# Patient Record
Sex: FEMALE | Race: WHITE | Employment: PART TIME | URBAN - NONMETROPOLITAN AREA
[De-identification: names, ages, dates, MRNs, and addresses within clinical notes are randomized per-mention and may not be internally consistent; named-entity substitution may affect disease eponyms.]

---

## 2017-01-23 ENCOUNTER — OFFICE VISIT (OUTPATIENT)
Dept: NEUROSURGERY | Age: 52
End: 2017-01-23

## 2017-01-23 VITALS
BODY MASS INDEX: 26.09 KG/M2 | SYSTOLIC BLOOD PRESSURE: 142 MMHG | DIASTOLIC BLOOD PRESSURE: 87 MMHG | WEIGHT: 156.6 LBS | HEART RATE: 85 BPM | HEIGHT: 65 IN

## 2017-01-23 DIAGNOSIS — M54.2 NECK PAIN: Primary | ICD-10-CM

## 2017-01-23 PROCEDURE — 99202 OFFICE O/P NEW SF 15 MIN: CPT | Performed by: NEUROLOGICAL SURGERY

## 2017-01-23 ASSESSMENT — ENCOUNTER SYMPTOMS
VOICE CHANGE: 0
SHORTNESS OF BREATH: 0
SINUS PRESSURE: 0
STRIDOR: 0
TROUBLE SWALLOWING: 0
BACK PAIN: 0
COLOR CHANGE: 0
RHINORRHEA: 0
ABDOMINAL PAIN: 0
PHOTOPHOBIA: 0
NAUSEA: 0
COUGH: 0
VOMITING: 0
EYE PAIN: 0

## 2017-02-08 ENCOUNTER — OFFICE VISIT (OUTPATIENT)
Dept: PHYSICAL MEDICINE AND REHAB | Age: 52
End: 2017-02-08

## 2017-02-08 VITALS
HEIGHT: 65 IN | DIASTOLIC BLOOD PRESSURE: 72 MMHG | BODY MASS INDEX: 26.42 KG/M2 | SYSTOLIC BLOOD PRESSURE: 132 MMHG | WEIGHT: 158.6 LBS | HEART RATE: 103 BPM

## 2017-02-08 DIAGNOSIS — G89.4 CHRONIC PAIN SYNDROME: ICD-10-CM

## 2017-02-08 DIAGNOSIS — M47.812 SPONDYLOSIS OF CERVICAL REGION WITHOUT MYELOPATHY OR RADICULOPATHY: Primary | ICD-10-CM

## 2017-02-08 PROCEDURE — 99244 OFF/OP CNSLTJ NEW/EST MOD 40: CPT | Performed by: PAIN MEDICINE

## 2017-02-08 RX ORDER — NICOTINE 7 MG/24H
PATCH, EXTENDED RELEASE TRANSDERMAL EVERY 24 HOURS
COMMUNITY
Start: 2017-02-03 | End: 2017-07-25 | Stop reason: ALTCHOICE

## 2017-02-08 ASSESSMENT — ENCOUNTER SYMPTOMS
VOMITING: 0
DIARRHEA: 0
NAUSEA: 0
SINUS PRESSURE: 0
RHINORRHEA: 0
COUGH: 0
SORE THROAT: 0
CONSTIPATION: 0
SHORTNESS OF BREATH: 0
EYE PAIN: 0
WHEEZING: 0
COLOR CHANGE: 0
BACK PAIN: 0
CHEST TIGHTNESS: 0
ABDOMINAL PAIN: 0
PHOTOPHOBIA: 0

## 2017-03-30 ENCOUNTER — OFFICE VISIT (OUTPATIENT)
Dept: PHYSICAL MEDICINE AND REHAB | Age: 52
End: 2017-03-30

## 2017-03-30 VITALS
SYSTOLIC BLOOD PRESSURE: 124 MMHG | BODY MASS INDEX: 26.19 KG/M2 | HEIGHT: 65 IN | WEIGHT: 157.19 LBS | HEART RATE: 83 BPM | DIASTOLIC BLOOD PRESSURE: 70 MMHG

## 2017-03-30 DIAGNOSIS — G89.4 CHRONIC PAIN SYNDROME: ICD-10-CM

## 2017-03-30 DIAGNOSIS — M47.812 SPONDYLOSIS OF CERVICAL REGION WITHOUT MYELOPATHY OR RADICULOPATHY: Primary | ICD-10-CM

## 2017-03-30 PROCEDURE — 99213 OFFICE O/P EST LOW 20 MIN: CPT | Performed by: NURSE PRACTITIONER

## 2017-03-30 RX ORDER — GABAPENTIN 300 MG/1
300 CAPSULE ORAL NIGHTLY
Qty: 30 CAPSULE | Refills: 0 | Status: SHIPPED | OUTPATIENT
Start: 2017-03-30 | End: 2017-04-29 | Stop reason: SDUPTHER

## 2017-03-30 ASSESSMENT — ENCOUNTER SYMPTOMS
COUGH: 0
COLOR CHANGE: 0
ABDOMINAL PAIN: 0
WHEEZING: 0
DIARRHEA: 0
SORE THROAT: 0
NAUSEA: 0
CHEST TIGHTNESS: 0
SINUS PRESSURE: 0
BACK PAIN: 0
RHINORRHEA: 0
VOMITING: 0
EYE PAIN: 0
SHORTNESS OF BREATH: 0
PHOTOPHOBIA: 0
CONSTIPATION: 0

## 2017-05-01 RX ORDER — GABAPENTIN 300 MG/1
CAPSULE ORAL
Qty: 30 CAPSULE | Refills: 0 | Status: SHIPPED | OUTPATIENT
Start: 2017-05-01 | End: 2017-05-25 | Stop reason: SDUPTHER

## 2017-05-25 ENCOUNTER — OFFICE VISIT (OUTPATIENT)
Dept: PHYSICAL MEDICINE AND REHAB | Age: 52
End: 2017-05-25

## 2017-05-25 VITALS
WEIGHT: 159 LBS | HEART RATE: 87 BPM | SYSTOLIC BLOOD PRESSURE: 117 MMHG | BODY MASS INDEX: 26.49 KG/M2 | DIASTOLIC BLOOD PRESSURE: 68 MMHG | HEIGHT: 65 IN

## 2017-05-25 DIAGNOSIS — M47.812 SPONDYLOSIS OF CERVICAL REGION WITHOUT MYELOPATHY OR RADICULOPATHY: Primary | ICD-10-CM

## 2017-05-25 DIAGNOSIS — G89.4 CHRONIC PAIN SYNDROME: ICD-10-CM

## 2017-05-25 PROCEDURE — 99213 OFFICE O/P EST LOW 20 MIN: CPT | Performed by: NURSE PRACTITIONER

## 2017-05-25 RX ORDER — GABAPENTIN 300 MG/1
CAPSULE ORAL
Qty: 30 CAPSULE | Refills: 0 | Status: SHIPPED | OUTPATIENT
Start: 2017-05-25 | End: 2017-06-26 | Stop reason: SDUPTHER

## 2017-05-25 RX ORDER — GABAPENTIN 300 MG/1
CAPSULE ORAL
Qty: 30 CAPSULE | Refills: 0 | Status: SHIPPED | OUTPATIENT
Start: 2017-05-25 | End: 2017-05-25 | Stop reason: SDUPTHER

## 2017-05-25 RX ORDER — TRAMADOL HYDROCHLORIDE 50 MG/1
50 TABLET ORAL EVERY 8 HOURS PRN
Qty: 90 TABLET | Refills: 0 | Status: SHIPPED | OUTPATIENT
Start: 2017-05-31 | End: 2017-07-31 | Stop reason: SDUPTHER

## 2017-05-25 ASSESSMENT — ENCOUNTER SYMPTOMS
VOMITING: 0
SHORTNESS OF BREATH: 0
FACIAL SWELLING: 0
RHINORRHEA: 0
WHEEZING: 0
BLOOD IN STOOL: 0
EYE DISCHARGE: 0
TROUBLE SWALLOWING: 0
APNEA: 0
SORE THROAT: 0
COLOR CHANGE: 0
CONSTIPATION: 0
CHEST TIGHTNESS: 0
ABDOMINAL DISTENTION: 0
EYE REDNESS: 0
DIARRHEA: 0
BACK PAIN: 1
SINUS PRESSURE: 0
NAUSEA: 0
COUGH: 0
EYE PAIN: 0
CHOKING: 0
VOICE CHANGE: 0
PHOTOPHOBIA: 0
EYE ITCHING: 0
STRIDOR: 0
ABDOMINAL PAIN: 0
RECTAL PAIN: 0
ANAL BLEEDING: 0

## 2017-06-26 DIAGNOSIS — G89.4 CHRONIC PAIN SYNDROME: ICD-10-CM

## 2017-06-26 DIAGNOSIS — M47.812 SPONDYLOSIS OF CERVICAL REGION WITHOUT MYELOPATHY OR RADICULOPATHY: ICD-10-CM

## 2017-06-26 RX ORDER — GABAPENTIN 300 MG/1
CAPSULE ORAL
Qty: 30 CAPSULE | Refills: 0 | Status: SHIPPED | OUTPATIENT
Start: 2017-06-26 | End: 2017-08-30 | Stop reason: SDUPTHER

## 2017-07-25 RX ORDER — NICOTINE 14MG/24HR
PATCH, TRANSDERMAL 24 HOURS TRANSDERMAL
Refills: 0 | COMMUNITY
Start: 2017-05-10 | End: 2017-10-09

## 2017-07-25 RX ORDER — NEOMYCIN SULFATE, POLYMYXIN B SULFATE, AND DEXAMETHASONE 3.5; 10000; 1 MG/G; [USP'U]/G; MG/G
OINTMENT OPHTHALMIC
COMMUNITY
Start: 2017-06-26 | End: 2017-08-30 | Stop reason: ALTCHOICE

## 2017-07-31 DIAGNOSIS — G89.4 CHRONIC PAIN SYNDROME: ICD-10-CM

## 2017-07-31 DIAGNOSIS — M47.812 SPONDYLOSIS OF CERVICAL REGION WITHOUT MYELOPATHY OR RADICULOPATHY: ICD-10-CM

## 2017-08-01 ENCOUNTER — HOSPITAL ENCOUNTER (OUTPATIENT)
Age: 52
Setting detail: OUTPATIENT SURGERY
Discharge: HOME | End: 2017-08-01
Attending: PAIN MEDICINE | Admitting: PAIN MEDICINE
Payer: COMMERCIAL

## 2017-08-01 ENCOUNTER — ANESTHESIA EVENT (OUTPATIENT)
Dept: OPERATING ROOM | Age: 52
End: 2017-08-01
Payer: COMMERCIAL

## 2017-08-01 ENCOUNTER — ANESTHESIA (OUTPATIENT)
Dept: OPERATING ROOM | Age: 52
End: 2017-08-01
Payer: COMMERCIAL

## 2017-08-01 ENCOUNTER — APPOINTMENT (OUTPATIENT)
Dept: GENERAL RADIOLOGY | Age: 52
End: 2017-08-01
Attending: PAIN MEDICINE
Payer: COMMERCIAL

## 2017-08-01 VITALS
SYSTOLIC BLOOD PRESSURE: 119 MMHG | TEMPERATURE: 97.1 F | HEART RATE: 90 BPM | OXYGEN SATURATION: 95 % | RESPIRATION RATE: 16 BRPM | HEIGHT: 65 IN | WEIGHT: 154 LBS | BODY MASS INDEX: 25.66 KG/M2 | DIASTOLIC BLOOD PRESSURE: 62 MMHG

## 2017-08-01 VITALS — OXYGEN SATURATION: 99 % | DIASTOLIC BLOOD PRESSURE: 61 MMHG | SYSTOLIC BLOOD PRESSURE: 102 MMHG

## 2017-08-01 PROCEDURE — 3600000058 HC PAIN LEVEL 5 BASE: Performed by: PAIN MEDICINE

## 2017-08-01 PROCEDURE — 2580000003 HC RX 258: Performed by: SPECIALIST

## 2017-08-01 PROCEDURE — 3700000001 HC ADD 15 MINUTES (ANESTHESIA): Performed by: PAIN MEDICINE

## 2017-08-01 PROCEDURE — 3209999900 FLUORO FOR SURGICAL PROCEDURES

## 2017-08-01 PROCEDURE — 2500000003 HC RX 250 WO HCPCS: Performed by: PAIN MEDICINE

## 2017-08-01 PROCEDURE — 64634 DESTROY C/TH FACET JNT ADDL: CPT | Performed by: PAIN MEDICINE

## 2017-08-01 PROCEDURE — 7100000010 HC PHASE II RECOVERY - FIRST 15 MIN: Performed by: PAIN MEDICINE

## 2017-08-01 PROCEDURE — 7100000011 HC PHASE II RECOVERY - ADDTL 15 MIN: Performed by: PAIN MEDICINE

## 2017-08-01 PROCEDURE — 6360000002 HC RX W HCPCS: Performed by: SPECIALIST

## 2017-08-01 PROCEDURE — 64633 DESTROY CERV/THOR FACET JNT: CPT | Performed by: PAIN MEDICINE

## 2017-08-01 PROCEDURE — 3600000059 HC PAIN LEVEL 5 ADDL 15 MIN: Performed by: PAIN MEDICINE

## 2017-08-01 PROCEDURE — 3700000000 HC ANESTHESIA ATTENDED CARE: Performed by: PAIN MEDICINE

## 2017-08-01 PROCEDURE — 6360000002 HC RX W HCPCS: Performed by: PAIN MEDICINE

## 2017-08-01 RX ORDER — TRAMADOL HYDROCHLORIDE 50 MG/1
TABLET ORAL
Qty: 90 TABLET | Refills: 0 | Status: SHIPPED | OUTPATIENT
Start: 2017-08-01 | End: 2017-08-30 | Stop reason: DRUGHIGH

## 2017-08-01 RX ORDER — SODIUM CHLORIDE 9 MG/ML
INJECTION, SOLUTION INTRAVENOUS CONTINUOUS PRN
Status: DISCONTINUED | OUTPATIENT
Start: 2017-08-01 | End: 2017-08-01 | Stop reason: SDUPTHER

## 2017-08-01 RX ORDER — FENTANYL CITRATE 50 UG/ML
INJECTION, SOLUTION INTRAMUSCULAR; INTRAVENOUS PRN
Status: DISCONTINUED | OUTPATIENT
Start: 2017-08-01 | End: 2017-08-01 | Stop reason: SDUPTHER

## 2017-08-01 RX ORDER — TRAMADOL HYDROCHLORIDE 50 MG/1
50 TABLET ORAL 3 TIMES DAILY
COMMUNITY
End: 2017-08-30 | Stop reason: SDUPTHER

## 2017-08-01 RX ORDER — LIDOCAINE HYDROCHLORIDE 10 MG/ML
INJECTION, SOLUTION EPIDURAL; INFILTRATION; INTRACAUDAL; PERINEURAL PRN
Status: DISCONTINUED | OUTPATIENT
Start: 2017-08-01 | End: 2017-08-01 | Stop reason: HOSPADM

## 2017-08-01 RX ORDER — PROPOFOL 10 MG/ML
INJECTION, EMULSION INTRAVENOUS PRN
Status: DISCONTINUED | OUTPATIENT
Start: 2017-08-01 | End: 2017-08-01 | Stop reason: SDUPTHER

## 2017-08-01 RX ORDER — PROPOFOL 10 MG/ML
INJECTION, EMULSION INTRAVENOUS PRN
Status: DISCONTINUED | OUTPATIENT
Start: 2017-08-01 | End: 2017-08-01

## 2017-08-01 RX ORDER — BUPIVACAINE HYDROCHLORIDE 2.5 MG/ML
INJECTION, SOLUTION EPIDURAL; INFILTRATION; INTRACAUDAL PRN
Status: DISCONTINUED | OUTPATIENT
Start: 2017-08-01 | End: 2017-08-01 | Stop reason: HOSPADM

## 2017-08-01 RX ORDER — DEXAMETHASONE SODIUM PHOSPHATE 4 MG/ML
INJECTION, SOLUTION INTRA-ARTICULAR; INTRALESIONAL; INTRAMUSCULAR; INTRAVENOUS; SOFT TISSUE PRN
Status: DISCONTINUED | OUTPATIENT
Start: 2017-08-01 | End: 2017-08-01 | Stop reason: HOSPADM

## 2017-08-01 RX ADMIN — SODIUM CHLORIDE: 9 INJECTION, SOLUTION INTRAVENOUS at 09:43

## 2017-08-01 RX ADMIN — PROPOFOL 30 MG: 10 INJECTION, EMULSION INTRAVENOUS at 10:06

## 2017-08-01 RX ADMIN — PROPOFOL 20 MG: 10 INJECTION, EMULSION INTRAVENOUS at 10:03

## 2017-08-01 RX ADMIN — PROPOFOL 30 MG: 10 INJECTION, EMULSION INTRAVENOUS at 10:04

## 2017-08-01 RX ADMIN — PROPOFOL 20 MG: 10 INJECTION, EMULSION INTRAVENOUS at 09:49

## 2017-08-01 RX ADMIN — FENTANYL CITRATE 50 MCG: 50 INJECTION INTRAMUSCULAR; INTRAVENOUS at 09:44

## 2017-08-01 RX ADMIN — FENTANYL CITRATE 50 MCG: 50 INJECTION INTRAMUSCULAR; INTRAVENOUS at 09:48

## 2017-08-01 RX ADMIN — LIDOCAINE HYDROCHLORIDE 40 MG: 20 INJECTION, SOLUTION INTRAVENOUS at 09:44

## 2017-08-01 ASSESSMENT — PULMONARY FUNCTION TESTS
PIF_VALUE: 0

## 2017-08-01 ASSESSMENT — PAIN DESCRIPTION - DESCRIPTORS: DESCRIPTORS: ACHING;DISCOMFORT

## 2017-08-01 ASSESSMENT — PAIN - FUNCTIONAL ASSESSMENT: PAIN_FUNCTIONAL_ASSESSMENT: 0-10

## 2017-08-01 ASSESSMENT — PAIN SCALES - GENERAL: PAINLEVEL_OUTOF10: 2

## 2017-08-04 ENCOUNTER — TELEPHONE (OUTPATIENT)
Dept: PHYSICAL MEDICINE AND REHAB | Age: 52
End: 2017-08-04

## 2017-08-28 RX ORDER — GABAPENTIN 300 MG/1
CAPSULE ORAL
Qty: 30 CAPSULE | Refills: 0 | Status: SHIPPED | OUTPATIENT
Start: 2017-08-30 | End: 2017-08-30 | Stop reason: SDUPTHER

## 2017-08-30 ENCOUNTER — OFFICE VISIT (OUTPATIENT)
Dept: PHYSICAL MEDICINE AND REHAB | Age: 52
End: 2017-08-30
Payer: COMMERCIAL

## 2017-08-30 VITALS
WEIGHT: 154 LBS | HEIGHT: 65 IN | HEART RATE: 84 BPM | BODY MASS INDEX: 25.66 KG/M2 | SYSTOLIC BLOOD PRESSURE: 113 MMHG | DIASTOLIC BLOOD PRESSURE: 71 MMHG

## 2017-08-30 DIAGNOSIS — G89.4 CHRONIC PAIN SYNDROME: ICD-10-CM

## 2017-08-30 DIAGNOSIS — M47.812 SPONDYLOSIS OF CERVICAL REGION WITHOUT MYELOPATHY OR RADICULOPATHY: Primary | ICD-10-CM

## 2017-08-30 PROCEDURE — 99213 OFFICE O/P EST LOW 20 MIN: CPT | Performed by: PAIN MEDICINE

## 2017-08-30 RX ORDER — GABAPENTIN 300 MG/1
CAPSULE ORAL
Qty: 30 CAPSULE | Refills: 0 | Status: SHIPPED | OUTPATIENT
Start: 2017-08-30 | End: 2017-10-03 | Stop reason: SDUPTHER

## 2017-08-30 RX ORDER — TRAMADOL HYDROCHLORIDE 50 MG/1
50 TABLET ORAL 3 TIMES DAILY
Qty: 90 TABLET | Refills: 0 | Status: SHIPPED | OUTPATIENT
Start: 2017-08-30 | End: 2017-10-03 | Stop reason: SDUPTHER

## 2017-08-30 ASSESSMENT — ENCOUNTER SYMPTOMS
RHINORRHEA: 0
BACK PAIN: 0
VOMITING: 0
EYE PAIN: 0
COUGH: 0
SORE THROAT: 0
COLOR CHANGE: 0
SHORTNESS OF BREATH: 0
ABDOMINAL PAIN: 0
CONSTIPATION: 0
CHEST TIGHTNESS: 0
DIARRHEA: 0
NAUSEA: 0
SINUS PRESSURE: 0
WHEEZING: 0
PHOTOPHOBIA: 0

## 2017-10-03 DIAGNOSIS — M47.812 SPONDYLOSIS OF CERVICAL REGION WITHOUT MYELOPATHY OR RADICULOPATHY: ICD-10-CM

## 2017-10-03 DIAGNOSIS — G89.4 CHRONIC PAIN SYNDROME: ICD-10-CM

## 2017-10-03 RX ORDER — GABAPENTIN 300 MG/1
CAPSULE ORAL
Qty: 30 CAPSULE | Refills: 0 | Status: SHIPPED | OUTPATIENT
Start: 2017-10-03 | End: 2017-10-30 | Stop reason: SDUPTHER

## 2017-10-03 RX ORDER — TRAMADOL HYDROCHLORIDE 50 MG/1
50 TABLET ORAL 3 TIMES DAILY
Qty: 90 TABLET | Refills: 0 | Status: SHIPPED | OUTPATIENT
Start: 2017-10-03 | End: 2018-02-06 | Stop reason: SDUPTHER

## 2017-10-30 DIAGNOSIS — M47.812 SPONDYLOSIS OF CERVICAL REGION WITHOUT MYELOPATHY OR RADICULOPATHY: ICD-10-CM

## 2017-10-30 DIAGNOSIS — G89.4 CHRONIC PAIN SYNDROME: ICD-10-CM

## 2017-10-30 RX ORDER — TRAMADOL HYDROCHLORIDE 50 MG/1
50 TABLET ORAL 3 TIMES DAILY PRN
Qty: 90 TABLET | Refills: 0 | Status: SHIPPED | OUTPATIENT
Start: 2017-11-02 | End: 2017-11-02 | Stop reason: SDUPTHER

## 2017-10-30 RX ORDER — GABAPENTIN 300 MG/1
CAPSULE ORAL
Qty: 30 CAPSULE | Refills: 0 | Status: SHIPPED | OUTPATIENT
Start: 2017-11-02 | End: 2017-11-28 | Stop reason: DRUGHIGH

## 2017-11-02 ENCOUNTER — OFFICE VISIT (OUTPATIENT)
Dept: PHYSICAL MEDICINE AND REHAB | Age: 52
End: 2017-11-02
Payer: COMMERCIAL

## 2017-11-02 VITALS
HEART RATE: 80 BPM | WEIGHT: 156.97 LBS | BODY MASS INDEX: 26.15 KG/M2 | SYSTOLIC BLOOD PRESSURE: 114 MMHG | DIASTOLIC BLOOD PRESSURE: 80 MMHG | HEIGHT: 65 IN

## 2017-11-02 DIAGNOSIS — G89.4 CHRONIC PAIN SYNDROME: ICD-10-CM

## 2017-11-02 DIAGNOSIS — M47.812 SPONDYLOSIS OF CERVICAL REGION WITHOUT MYELOPATHY OR RADICULOPATHY: Primary | ICD-10-CM

## 2017-11-02 DIAGNOSIS — M79.601 RIGHT ARM PAIN: ICD-10-CM

## 2017-11-02 PROCEDURE — 99213 OFFICE O/P EST LOW 20 MIN: CPT | Performed by: NURSE PRACTITIONER

## 2017-11-02 PROCEDURE — G8427 DOCREV CUR MEDS BY ELIG CLIN: HCPCS | Performed by: NURSE PRACTITIONER

## 2017-11-02 PROCEDURE — G8417 CALC BMI ABV UP PARAM F/U: HCPCS | Performed by: NURSE PRACTITIONER

## 2017-11-02 PROCEDURE — 3017F COLORECTAL CA SCREEN DOC REV: CPT | Performed by: NURSE PRACTITIONER

## 2017-11-02 PROCEDURE — G8484 FLU IMMUNIZE NO ADMIN: HCPCS | Performed by: NURSE PRACTITIONER

## 2017-11-02 PROCEDURE — 4004F PT TOBACCO SCREEN RCVD TLK: CPT | Performed by: NURSE PRACTITIONER

## 2017-11-02 PROCEDURE — 3014F SCREEN MAMMO DOC REV: CPT | Performed by: NURSE PRACTITIONER

## 2017-11-02 NOTE — PROGRESS NOTES
receiving pain medications from other sources. She had 1 ER visit since last visit. Pain scale with out pain medications is 8/10. Pain scale with pain medications is  4-5/10. Last dose of Ultram and Neurontin 11/1/2017- pm   Drug screen reviewed from 8/30/2017- isac     The patient is allergic to strawberry (diagnostic). Past Medical History  Chance Park  has a past medical history of Herniated disc, cervical.    Past Surgical History  The patient  has a past surgical history that includes Tubal ligation; Appendectomy; Rotator cuff repair (Left); Tonsillectomy; other surgical history (Bilateral, 03/06/2017); other surgical history (Bilateral, 05/02/2017); Nerve Surgery (Left, 08/01/2017); and dest,paravertebral,c/t,single (Left, 8/1/2017). Family History  This patient's family history includes Cancer in her father and another family member; Dementia in her mother; Diabetes in her mother; Stroke in her mother. Social History  Chance Park  reports that she has been smoking Cigarettes. She has a 30.00 pack-year smoking history. She has never used smokeless tobacco. She reports that she does not drink alcohol or use drugs. Medications    Current Outpatient Prescriptions:     gabapentin (NEURONTIN) 300 MG capsule, TAKE 1 CAPSULE BY MOUTH NIGHTLY, Disp: 30 capsule, Rfl: 0    traMADol (ULTRAM) 50 MG tablet, Take 1 tablet by mouth three times daily, Disp: 90 tablet, Rfl: 0    cyclobenzaprine (FLEXERIL) 5 MG tablet, Take 5 mg by mouth 2 times daily as needed , Disp: , Rfl:     Subjective:      Review of Systems   Musculoskeletal: Positive for arthralgias, myalgias, neck pain and neck stiffness. Neurological: Positive for weakness and numbness. Objective:     Vitals:    11/02/17 0742   BP: 114/80   Pulse: 80   Weight: 156 lb 15.5 oz (71.2 kg)   Height: 5' 5\" (1.651 m)       Physical Exam   Constitutional: She is oriented to person, place, and time. She appears well-developed and well-nourished. No distress. HENT:   Head: Normocephalic and atraumatic. Right Ear: External ear normal.   Left Ear: External ear normal.   Nose: Nose normal.   Mouth/Throat: Oropharynx is clear and moist. No oropharyngeal exudate. Eyes: Conjunctivae and EOM are normal. Pupils are equal, round, and reactive to light. Right eye exhibits no discharge. Left eye exhibits no discharge. No scleral icterus. Neck: Normal range of motion and full passive range of motion without pain. Neck supple. No muscular tenderness present. No neck rigidity. No edema, no erythema and normal range of motion present. No thyromegaly present. Cardiovascular: Normal rate, regular rhythm, normal heart sounds and intact distal pulses. Exam reveals no gallop and no friction rub. No murmur heard. Pulmonary/Chest: Effort normal and breath sounds normal. No respiratory distress. She has no wheezes. She has no rales. She exhibits no tenderness. Abdominal: Soft. Bowel sounds are normal. She exhibits no distension. There is no tenderness. There is no rebound and no guarding. Musculoskeletal:        Right shoulder: Normal.        Left shoulder: Normal.        Right elbow: Normal.       Left elbow: Normal.        Right wrist: Normal.        Left wrist: Normal.        Right hip: Normal.        Left hip: Normal.        Right knee: Normal.        Left knee: Normal.        Right ankle: Normal.        Left ankle: Normal.        Cervical back: She exhibits tenderness, pain and spasm. Thoracic back: She exhibits tenderness, pain and spasm. Lumbar back: She exhibits tenderness. Back:         Right upper leg: Normal.        Left upper leg: Normal.        Right lower leg: Normal.        Left lower leg: Normal.        Right foot: Normal.        Left foot: Normal.   Neurological: She is alert and oriented to person, place, and time. She has normal strength. She is not disoriented. She displays no atrophy. A sensory deficit is present.  No cranial nerve deficit. She exhibits normal muscle tone. She displays a negative Romberg sign. Coordination and gait normal. She displays no Babinski's sign on the right side. Reflex Scores:       Tricep reflexes are 1+ on the right side and 2+ on the left side. Bicep reflexes are 1+ on the right side and 2+ on the left side. Brachioradialis reflexes are 1+ on the right side and 2+ on the left side. Patellar reflexes are 2+ on the right side and 2+ on the left side. Achilles reflexes are 2+ on the right side and 2+ on the left side. SLR-  Motor 5/5  numbness in right hand    Skin: Skin is warm. No rash noted. She is not diaphoretic. No erythema. No pallor. Psychiatric: Her mood appears not anxious. Her affect is not angry, not blunt, not labile and not inappropriate. Her speech is not rapid and/or pressured, not delayed, not tangential and not slurred. She is not agitated, not aggressive, not hyperactive, not slowed, not withdrawn, not actively hallucinating and not combative. Thought content is not paranoid and not delusional. Cognition and memory are not impaired. She does not express impulsivity or inappropriate judgment. She does not exhibit a depressed mood. She expresses no homicidal and no suicidal ideation. She expresses no suicidal plans and no homicidal plans. She is communicative. She exhibits normal recent memory and normal remote memory. She is attentive. Nursing note and vitals reviewed. Assessment:     1. Spondylosis of cervical region without myelopathy or radiculopathy    2. Chronic pain syndrome    3. Right arm pain            Plan:      · OARRS reviewed. · Discussed long term side effects of medications. · Discussed tolerance, dependency and addiction. · Previous UDS reviewed. · UDS preformed today for compliance. · Patient told can not receive any pain medications from any other source. · Discussed with pt.may not be pain free.   · No evidence of abuse, diversion

## 2017-11-07 ENCOUNTER — HOSPITAL ENCOUNTER (OUTPATIENT)
Age: 52
Setting detail: OUTPATIENT SURGERY
Discharge: HOME | End: 2017-11-07
Attending: PAIN MEDICINE | Admitting: PAIN MEDICINE
Payer: COMMERCIAL

## 2017-11-07 ENCOUNTER — ANESTHESIA EVENT (OUTPATIENT)
Dept: OPERATING ROOM | Age: 52
End: 2017-11-07
Payer: COMMERCIAL

## 2017-11-07 ENCOUNTER — APPOINTMENT (OUTPATIENT)
Dept: GENERAL RADIOLOGY | Age: 52
End: 2017-11-07
Attending: PAIN MEDICINE
Payer: COMMERCIAL

## 2017-11-07 ENCOUNTER — ANESTHESIA (OUTPATIENT)
Dept: OPERATING ROOM | Age: 52
End: 2017-11-07
Payer: COMMERCIAL

## 2017-11-07 VITALS
RESPIRATION RATE: 11 BRPM | TEMPERATURE: 97.3 F | WEIGHT: 154.2 LBS | HEIGHT: 65 IN | OXYGEN SATURATION: 94 % | HEART RATE: 98 BPM | SYSTOLIC BLOOD PRESSURE: 92 MMHG | BODY MASS INDEX: 25.69 KG/M2 | DIASTOLIC BLOOD PRESSURE: 54 MMHG

## 2017-11-07 VITALS — OXYGEN SATURATION: 99 % | SYSTOLIC BLOOD PRESSURE: 105 MMHG | DIASTOLIC BLOOD PRESSURE: 58 MMHG

## 2017-11-07 PROCEDURE — 3600000058 HC PAIN LEVEL 5 BASE: Performed by: PAIN MEDICINE

## 2017-11-07 PROCEDURE — 7100000010 HC PHASE II RECOVERY - FIRST 15 MIN: Performed by: PAIN MEDICINE

## 2017-11-07 PROCEDURE — 2500000003 HC RX 250 WO HCPCS: Performed by: PAIN MEDICINE

## 2017-11-07 PROCEDURE — 3209999900 FLUORO FOR SURGICAL PROCEDURES

## 2017-11-07 PROCEDURE — 6360000002 HC RX W HCPCS: Performed by: NURSE ANESTHETIST, CERTIFIED REGISTERED

## 2017-11-07 PROCEDURE — 2500000003 HC RX 250 WO HCPCS: Performed by: NURSE ANESTHETIST, CERTIFIED REGISTERED

## 2017-11-07 PROCEDURE — 7100000011 HC PHASE II RECOVERY - ADDTL 15 MIN: Performed by: PAIN MEDICINE

## 2017-11-07 PROCEDURE — 6360000002 HC RX W HCPCS

## 2017-11-07 PROCEDURE — 6360000002 HC RX W HCPCS: Performed by: PAIN MEDICINE

## 2017-11-07 PROCEDURE — 2580000003 HC RX 258: Performed by: NURSE ANESTHETIST, CERTIFIED REGISTERED

## 2017-11-07 PROCEDURE — 64634 DESTROY C/TH FACET JNT ADDL: CPT | Performed by: PAIN MEDICINE

## 2017-11-07 PROCEDURE — 3700000000 HC ANESTHESIA ATTENDED CARE: Performed by: PAIN MEDICINE

## 2017-11-07 PROCEDURE — A6402 STERILE GAUZE <= 16 SQ IN: HCPCS | Performed by: PAIN MEDICINE

## 2017-11-07 PROCEDURE — 64633 DESTROY CERV/THOR FACET JNT: CPT | Performed by: PAIN MEDICINE

## 2017-11-07 RX ORDER — LIDOCAINE HYDROCHLORIDE 10 MG/ML
INJECTION, SOLUTION INFILTRATION; PERINEURAL PRN
Status: DISCONTINUED | OUTPATIENT
Start: 2017-11-07 | End: 2017-11-07 | Stop reason: SDUPTHER

## 2017-11-07 RX ORDER — LIDOCAINE HYDROCHLORIDE 10 MG/ML
INJECTION, SOLUTION EPIDURAL; INFILTRATION; INTRACAUDAL; PERINEURAL PRN
Status: DISCONTINUED | OUTPATIENT
Start: 2017-11-07 | End: 2017-11-07 | Stop reason: HOSPADM

## 2017-11-07 RX ORDER — FENTANYL CITRATE 50 UG/ML
INJECTION, SOLUTION INTRAMUSCULAR; INTRAVENOUS PRN
Status: DISCONTINUED | OUTPATIENT
Start: 2017-11-07 | End: 2017-11-07 | Stop reason: SDUPTHER

## 2017-11-07 RX ORDER — BUPIVACAINE HYDROCHLORIDE 2.5 MG/ML
INJECTION, SOLUTION EPIDURAL; INFILTRATION; INTRACAUDAL PRN
Status: DISCONTINUED | OUTPATIENT
Start: 2017-11-07 | End: 2017-11-07 | Stop reason: HOSPADM

## 2017-11-07 RX ORDER — PROPOFOL 10 MG/ML
INJECTION, EMULSION INTRAVENOUS PRN
Status: DISCONTINUED | OUTPATIENT
Start: 2017-11-07 | End: 2017-11-07 | Stop reason: SDUPTHER

## 2017-11-07 RX ORDER — SODIUM CHLORIDE 9 MG/ML
INJECTION, SOLUTION INTRAVENOUS CONTINUOUS PRN
Status: DISCONTINUED | OUTPATIENT
Start: 2017-11-07 | End: 2017-11-07 | Stop reason: SDUPTHER

## 2017-11-07 RX ORDER — DEXAMETHASONE SODIUM PHOSPHATE 4 MG/ML
INJECTION, SOLUTION INTRA-ARTICULAR; INTRALESIONAL; INTRAMUSCULAR; INTRAVENOUS; SOFT TISSUE PRN
Status: DISCONTINUED | OUTPATIENT
Start: 2017-11-07 | End: 2017-11-07 | Stop reason: HOSPADM

## 2017-11-07 RX ADMIN — PROPOFOL 40 MG: 10 INJECTION, EMULSION INTRAVENOUS at 14:44

## 2017-11-07 RX ADMIN — FENTANYL CITRATE 25 MCG: 50 INJECTION INTRAMUSCULAR; INTRAVENOUS at 14:37

## 2017-11-07 RX ADMIN — LIDOCAINE HYDROCHLORIDE 40 MG: 10 INJECTION, SOLUTION INFILTRATION; PERINEURAL at 14:37

## 2017-11-07 RX ADMIN — FENTANYL CITRATE 75 MCG: 50 INJECTION INTRAMUSCULAR; INTRAVENOUS at 14:44

## 2017-11-07 RX ADMIN — PROPOFOL 20 MG: 10 INJECTION, EMULSION INTRAVENOUS at 14:37

## 2017-11-07 RX ADMIN — SODIUM CHLORIDE: 9 INJECTION, SOLUTION INTRAVENOUS at 14:33

## 2017-11-07 ASSESSMENT — PULMONARY FUNCTION TESTS
PIF_VALUE: 0

## 2017-11-07 ASSESSMENT — PAIN - FUNCTIONAL ASSESSMENT: PAIN_FUNCTIONAL_ASSESSMENT: 0-10

## 2017-11-07 ASSESSMENT — PAIN DESCRIPTION - DESCRIPTORS: DESCRIPTORS: SHARP;SORE

## 2017-11-07 NOTE — ANESTHESIA PRE PROCEDURE
Department of Anesthesiology  Preprocedure Note       Name:  Daniel Seth   Age:  47 y.o.  :  1965                                          MRN:  [id number]         Date:  2017      Surgeon: Matt Rizo):  Stella Flanagan MD    Procedure: Procedure(s):  RIGHT SIDE C-FACET RFA @ C4-5, C5-6, C6-7    Medications prior to admission:   Prior to Admission medications    Medication Sig Start Date End Date Taking? Authorizing Provider   gabapentin (NEURONTIN) 300 MG capsule TAKE 1 CAPSULE BY MOUTH NIGHTLY 17  Yes Leanna Toth CNP   traMADol (ULTRAM) 50 MG tablet Take 1 tablet by mouth three times daily 10/3/17  Yes Leanna Toth CNP   cyclobenzaprine (FLEXERIL) 5 MG tablet Take 5 mg by mouth 2 times daily as needed  16  Yes Historical Provider, MD       Current medications:    No current facility-administered medications for this encounter. Allergies:     Allergies   Allergen Reactions    Strawberry (Diagnostic) Swelling       Problem List:    Patient Active Problem List   Diagnosis Code    Spondylosis of cervical region without myelopathy or radiculopathy M47.812       Past Medical History:        Diagnosis Date    Herniated disc, cervical     x2       Past Surgical History:        Procedure Laterality Date    APPENDECTOMY      NERVE SURGERY Left 2017    CERVICAL RFA C4-5, C5-6, C6-7    OTHER SURGICAL HISTORY Bilateral 2017    CERVICAL FACET MBB C4-5, C5-6, C6-7    OTHER SURGICAL HISTORY Bilateral 2017    Cervical Facet Block C4-5, C5-6, C6-7 Bilateral    [place] DEST,PARAVERTEBRAL,C/T,SINGLE Left 2017    CERVICAL RFA C4-5, C5-6, C6-7, LEFT performed by Stella Flanagan MD at [place] [address] Left     TONSILLECTOMY      TUBAL LIGATION         Social History:    Social History   Substance Use Topics    Smoking status: Current Every Day Smoker     Packs/day: 1.00     Years: 30.00     Types: Cigarettes    Smokeless Plan      MAC     ASA 2       Induction: intravenous. Anesthetic plan and risks discussed with patient. Plan discussed with CRNA. Jose Flores DO   11/7/2017

## 2017-11-07 NOTE — ANESTHESIA POSTPROCEDURE EVALUATION
Department of Anesthesiology  Postprocedure Note    Patient: Tyrle Bonilla  MRN: [id number]  YOB: 1965  Date of evaluation: 11/7/2017  Time:  3:00 PM     Procedure Summary     Date:  11/07/17 Room / Location:  [facility] OR 01 / [place]    Anesthesia Start:  5394 Anesthesia Stop:  0056    Procedure:  RIGHT SIDE C-FACET RFA @ C4-5, C5-6, C6-7 (Right Neck) Diagnosis:  (SPONDYLOSIS OF CERVICAL REGION W/O MYELOPATHY OR RADICULOPATHY)    Surgeon:  Rosario Cosby MD Responsible Provider:  Prachi Jade DO    Anesthesia Type:  MAC ASA Status:  2          Anesthesia Type: MAC    Allie Phase I:      Allie Phase II:      Last vitals: Reviewed and per EMR flowsheets.        Anesthesia Post Evaluation    Patient location during evaluation: PACU  Patient participation: complete - patient participated  Level of consciousness: awake and alert  Airway patency: patent  Nausea & Vomiting: no vomiting and no nausea  Cardiovascular status: hemodynamically stable  Respiratory status: acceptable  Hydration status: stable

## 2017-11-07 NOTE — H&P
[facility]  History and Physical Update    Pt Name: Anastasia Viveros  MRN: [id number]  YOB: 1965  Date of evaluation: 11/2/2017    I have examined the patient and reviewed the H&P/Consult and there are no changes to the patient or plans.         Electronically signed by Viviana Barahona MD on 11/7/2017 at 1:41 PM

## 2017-11-07 NOTE — PROGRESS NOTES
1450-  Patient arrived to phase II via cart. Spontaneous respirations even and unlabored. Placed on monitor--VSS. Report received from Talisha Simental and Daniela Arboleda CRNA.    6636-  Assessment completed. Patient is drowsy, but responds to name and follows commands. IV capped off-- no complications. Patient states she is having pain-- ice pack applied. 1455-  Pepsi and muffin given to patient. 12-  Belongings brought to patient. 1510-  Discharge instructions given. Understanding verbalized. 1514-  Patient discharged in stable condition with all belongings.

## 2017-11-07 NOTE — OP NOTE
Pre-Procedure Note    Patient Name: Daniel Seth   YOB: 1965  Medical Record Number: [id number]  Date: 11/2/2017     Indication: Neck pain  Consent: On file. Vital Signs:   Vitals:    11/07/17 1358   BP: 102/63   Pulse: 85   Resp: 16   Temp: 97.4 °F (36.3 °C)   SpO2: 97%       Past Medical History:   has a past medical history of Herniated disc, cervical.    Past Surgical History:   has a past surgical history that includes Tubal ligation; Appendectomy; Rotator cuff repair (Left); Tonsillectomy; other surgical history (Bilateral, 03/06/2017); other surgical history (Bilateral, 05/02/2017); Nerve Surgery (Left, 08/01/2017); and dest,paravertebral,c/t,single (Left, 8/1/2017). Pre-Sedation Documentation and Exam:   Vital signs have been reviewed (see flow sheet for vitals). Sedation/ Anesthesia :  MAC. Patient is an appropriate candidate for plan of sedation: yes      Preoperative Diagnosis: C-spondylosis    Post-Op Dx:  As above    Procedure Performed:  Radiofrequency abalation of median branchs at the levels of C4-5,C5-6 and C6-7 right under fluoroscopic guidance     Indication for the Procedure: The patient had history of chronic neck pain that is not responding well to the conservative treatment. Patient's pain is mostly axial in nature. Pain is interfering with the activities of daily living. Physical examination revealed facet tenderness and facet loading is positive. The patient had undergone cervical  facet joint injections with pain relief that lasted for only a short period of time and confirmatory median branch block gave patient pain relief of 80 % . Hence we decided to do radiofrequency abalation of median branches for long term pain relief. The procedure and risks were discussed with the patient and an informed consent was obtained. Procedure:      A meaningful communication was kept up with the patient throughout  the procedure.   Patient is placed in prone position and

## 2017-11-28 ENCOUNTER — OFFICE VISIT (OUTPATIENT)
Dept: PHYSICAL MEDICINE AND REHAB | Age: 52
End: 2017-11-28
Payer: COMMERCIAL

## 2017-11-28 VITALS
SYSTOLIC BLOOD PRESSURE: 124 MMHG | WEIGHT: 154.1 LBS | DIASTOLIC BLOOD PRESSURE: 70 MMHG | BODY MASS INDEX: 25.67 KG/M2 | HEIGHT: 65 IN | HEART RATE: 83 BPM

## 2017-11-28 DIAGNOSIS — M47.812 SPONDYLOSIS OF CERVICAL REGION WITHOUT MYELOPATHY OR RADICULOPATHY: Primary | ICD-10-CM

## 2017-11-28 DIAGNOSIS — M79.601 RIGHT ARM PAIN: ICD-10-CM

## 2017-11-28 DIAGNOSIS — G89.4 CHRONIC PAIN SYNDROME: ICD-10-CM

## 2017-11-28 PROCEDURE — 4004F PT TOBACCO SCREEN RCVD TLK: CPT | Performed by: NURSE PRACTITIONER

## 2017-11-28 PROCEDURE — 99213 OFFICE O/P EST LOW 20 MIN: CPT | Performed by: NURSE PRACTITIONER

## 2017-11-28 PROCEDURE — G8417 CALC BMI ABV UP PARAM F/U: HCPCS | Performed by: NURSE PRACTITIONER

## 2017-11-28 PROCEDURE — 3017F COLORECTAL CA SCREEN DOC REV: CPT | Performed by: NURSE PRACTITIONER

## 2017-11-28 PROCEDURE — G8484 FLU IMMUNIZE NO ADMIN: HCPCS | Performed by: NURSE PRACTITIONER

## 2017-11-28 PROCEDURE — G8427 DOCREV CUR MEDS BY ELIG CLIN: HCPCS | Performed by: NURSE PRACTITIONER

## 2017-11-28 PROCEDURE — 3014F SCREEN MAMMO DOC REV: CPT | Performed by: NURSE PRACTITIONER

## 2017-11-28 RX ORDER — GABAPENTIN 300 MG/1
300 CAPSULE ORAL 2 TIMES DAILY
Qty: 60 CAPSULE | Refills: 0 | Status: SHIPPED | OUTPATIENT
Start: 2017-11-28 | End: 2017-12-24 | Stop reason: SDUPTHER

## 2017-11-28 RX ORDER — TRAMADOL HYDROCHLORIDE 50 MG/1
50 TABLET ORAL 3 TIMES DAILY PRN
Qty: 90 TABLET | Refills: 0 | Status: SHIPPED | OUTPATIENT
Start: 2017-11-30 | End: 2018-01-09 | Stop reason: SDUPTHER

## 2017-11-28 RX ORDER — GABAPENTIN 300 MG/1
300 CAPSULE ORAL 2 TIMES DAILY
COMMUNITY
End: 2017-11-28 | Stop reason: ALTCHOICE

## 2017-11-28 NOTE — PROGRESS NOTES
[facility]  [facility]  [address]  Dept: [phone]  Dept Fax: [phone]: [phone]    Visit Date: 11/28/2017    Functionality Assessment/Goals Worksheet     On a scale of 0 (Does not Interfere) to 10 (Completely Interferes)     1. Which number describes how during the past week pain has interfered with       the following:  A. General Activity:  5  B. Mood: 7  C. Walking Ability:  1  D. Normal Work (Includes both work outside the home and housework):  5  E. Relations with Other People:   6  F. Sleep:   7  G. Enjoyment of Life:   7    2. Patient Prefers to Take their Pain Medications:     [x]  On a regular basis   []  Only when necessary    []  Does not take pain medications    3. What are the Patient's Goals/Expectations for Visiting Pain Management? [x]  Learn about my pain    []  Receive Medication   []  Physical Therapy     []  Treat Depression   []  Receive Injections    []  Treat Sleep   []  Deal with Anxiety and Stress   []  Treat Opoid Dependence/Addiction   []  Other:      HPI:   Mele Rubin is a 47 y.o. female is here today for    Chief Complaint: Neck pain     HPI   FU Bilateral C-RFA left side from 8/1/2017 and right side from 11/7/2017. Receiving 70% relief from L-RFA. Pain is much better, able to get around better. Continues to have neck pain localized in center of neck. Dull ache. Ultram and Neurontin are effective for rest of pain. Medications reviewed. Patient denies  side effects with medications. Patient states she is  taking medications as prescribed. She denies receiving pain medications from other sources. She denies any ER visits since last visit. Pain scale with out pain medications is 4/10. Pain scale with pain medications is  2/10. Last dose of Ultram 11/27/2017    Drug screen reviewed from 11/1/2017- isac     The patient is allergic to strawberry (diagnostic).     Past Medical History  Gisela Wellington Coordination and gait normal. She displays no Babinski's sign on the right side. Reflex Scores:       Tricep reflexes are 1+ on the right side and 2+ on the left side. Bicep reflexes are 1+ on the right side and 2+ on the left side. Brachioradialis reflexes are 1+ on the right side and 2+ on the left side. Patellar reflexes are 2+ on the right side and 2+ on the left side. Achilles reflexes are 2+ on the right side and 2+ on the left side. SLR-  Motor 5/5  numbness in right hand    Skin: Skin is warm. No rash noted. She is not diaphoretic. No erythema. No pallor. Psychiatric: Her mood appears not anxious. Her affect is not angry, not blunt, not labile and not inappropriate. Her speech is not rapid and/or pressured, not delayed, not tangential and not slurred. She is not agitated, not aggressive, not hyperactive, not slowed, not withdrawn, not actively hallucinating and not combative. Thought content is not paranoid and not delusional. Cognition and memory are not impaired. She does not express impulsivity or inappropriate judgment. She does not exhibit a depressed mood. She expresses no homicidal and no suicidal ideation. She expresses no suicidal plans and no homicidal plans. She is communicative. She exhibits normal recent memory and normal remote memory. She is attentive. Nursing note and vitals reviewed. Assessment:     1. Spondylosis of cervical region without myelopathy or radiculopathy    2. Chronic pain syndrome    3. Right arm pain            Plan:      · OARRS reviewed. · Discussed long term side effects of medications. · Discussed tolerance, dependency and addiction. · Previous UDS reviewed. · Patient told can not receive any pain medications from any other source. · Discussed with pt.may not be pain free. · No evidence of abuse, diversion or aberrant behavior. · Medications and/or procedures improve function and quality of life.   · Procedure notes reviewed

## 2017-12-24 DIAGNOSIS — G89.4 CHRONIC PAIN SYNDROME: ICD-10-CM

## 2017-12-24 DIAGNOSIS — M47.812 SPONDYLOSIS OF CERVICAL REGION WITHOUT MYELOPATHY OR RADICULOPATHY: ICD-10-CM

## 2017-12-26 RX ORDER — GABAPENTIN 300 MG/1
300 CAPSULE ORAL 2 TIMES DAILY
Qty: 60 CAPSULE | Refills: 0 | Status: SHIPPED | OUTPATIENT
Start: 2017-12-26 | End: 2018-01-23 | Stop reason: SDUPTHER

## 2018-01-09 DIAGNOSIS — M47.812 SPONDYLOSIS OF CERVICAL REGION WITHOUT MYELOPATHY OR RADICULOPATHY: Primary | ICD-10-CM

## 2018-01-09 RX ORDER — TRAMADOL HYDROCHLORIDE 50 MG/1
50 TABLET ORAL 3 TIMES DAILY PRN
Qty: 90 TABLET | Refills: 0 | Status: SHIPPED | OUTPATIENT
Start: 2018-01-09 | End: 2018-02-06 | Stop reason: SDUPTHER

## 2018-01-23 DIAGNOSIS — M47.812 SPONDYLOSIS OF CERVICAL REGION WITHOUT MYELOPATHY OR RADICULOPATHY: ICD-10-CM

## 2018-01-23 DIAGNOSIS — G89.4 CHRONIC PAIN SYNDROME: ICD-10-CM

## 2018-01-23 RX ORDER — GABAPENTIN 300 MG/1
300 CAPSULE ORAL 2 TIMES DAILY
Qty: 60 CAPSULE | Refills: 0 | Status: SHIPPED | OUTPATIENT
Start: 2018-01-25 | End: 2018-03-06 | Stop reason: SDUPTHER

## 2018-02-06 ENCOUNTER — OFFICE VISIT (OUTPATIENT)
Dept: PHYSICAL MEDICINE AND REHAB | Age: 53
End: 2018-02-06
Payer: COMMERCIAL

## 2018-02-06 VITALS
SYSTOLIC BLOOD PRESSURE: 111 MMHG | HEIGHT: 65 IN | DIASTOLIC BLOOD PRESSURE: 71 MMHG | BODY MASS INDEX: 26.02 KG/M2 | WEIGHT: 156.2 LBS | HEART RATE: 95 BPM

## 2018-02-06 DIAGNOSIS — M79.601 RIGHT ARM PAIN: ICD-10-CM

## 2018-02-06 DIAGNOSIS — M47.812 SPONDYLOSIS OF CERVICAL REGION WITHOUT MYELOPATHY OR RADICULOPATHY: Primary | ICD-10-CM

## 2018-02-06 DIAGNOSIS — G89.4 CHRONIC PAIN SYNDROME: ICD-10-CM

## 2018-02-06 PROCEDURE — 99213 OFFICE O/P EST LOW 20 MIN: CPT | Performed by: NURSE PRACTITIONER

## 2018-02-06 PROCEDURE — 3014F SCREEN MAMMO DOC REV: CPT | Performed by: NURSE PRACTITIONER

## 2018-02-06 PROCEDURE — G8417 CALC BMI ABV UP PARAM F/U: HCPCS | Performed by: NURSE PRACTITIONER

## 2018-02-06 PROCEDURE — 3017F COLORECTAL CA SCREEN DOC REV: CPT | Performed by: NURSE PRACTITIONER

## 2018-02-06 PROCEDURE — 4004F PT TOBACCO SCREEN RCVD TLK: CPT | Performed by: NURSE PRACTITIONER

## 2018-02-06 PROCEDURE — G8427 DOCREV CUR MEDS BY ELIG CLIN: HCPCS | Performed by: NURSE PRACTITIONER

## 2018-02-06 PROCEDURE — G8484 FLU IMMUNIZE NO ADMIN: HCPCS | Performed by: NURSE PRACTITIONER

## 2018-02-06 RX ORDER — TRAMADOL HYDROCHLORIDE 50 MG/1
50 TABLET ORAL EVERY 8 HOURS PRN
Qty: 90 TABLET | Refills: 0 | Status: SHIPPED | OUTPATIENT
Start: 2018-02-08 | End: 2018-03-10

## 2018-02-06 RX ORDER — BUPROPION HYDROCHLORIDE 150 MG/1
150 TABLET, EXTENDED RELEASE ORAL 2 TIMES DAILY
COMMUNITY
Start: 2018-01-22 | End: 2018-06-13 | Stop reason: ALTCHOICE

## 2018-02-06 NOTE — PROGRESS NOTES
Left Ear: External ear normal.   Nose: Nose normal.   Mouth/Throat: Oropharynx is clear and moist. No oropharyngeal exudate. Eyes: Conjunctivae and EOM are normal. Pupils are equal, round, and reactive to light. Right eye exhibits no discharge. Left eye exhibits no discharge. No scleral icterus. Neck: Normal range of motion and full passive range of motion without pain. Neck supple. No muscular tenderness present. No neck rigidity. No edema, no erythema and normal range of motion present. No thyromegaly present. Cardiovascular: Normal rate, regular rhythm, normal heart sounds and intact distal pulses. Exam reveals no gallop and no friction rub. No murmur heard. Pulmonary/Chest: Effort normal and breath sounds normal. No respiratory distress. She has no wheezes. She has no rales. She exhibits no tenderness. Abdominal: Soft. Bowel sounds are normal. She exhibits no distension. There is no tenderness. There is no rebound and no guarding. Musculoskeletal:        Right shoulder: Normal.        Left shoulder: Normal.        Right elbow: Normal.       Left elbow: Normal.        Right wrist: Normal.        Left wrist: Normal.        Right hip: Normal.        Left hip: Normal.        Right knee: Normal.        Left knee: Normal.        Right ankle: Normal.        Left ankle: Normal.        Cervical back: She exhibits tenderness, pain and spasm. Thoracic back: She exhibits tenderness, pain and spasm. Lumbar back: She exhibits tenderness. Back:         Right upper leg: Normal.        Left upper leg: Normal.        Right lower leg: Normal.        Left lower leg: Normal.        Right foot: Normal.        Left foot: Normal.   Neurological: She is alert and oriented to person, place, and time. She has normal strength. She is not disoriented. She displays no atrophy. A sensory deficit is present. No cranial nerve deficit. She exhibits normal muscle tone. She displays a negative Romberg sign.

## 2018-03-06 DIAGNOSIS — G89.4 CHRONIC PAIN SYNDROME: ICD-10-CM

## 2018-03-06 DIAGNOSIS — M47.812 SPONDYLOSIS OF CERVICAL REGION WITHOUT MYELOPATHY OR RADICULOPATHY: ICD-10-CM

## 2018-03-06 RX ORDER — GABAPENTIN 300 MG/1
300 CAPSULE ORAL 2 TIMES DAILY
Qty: 60 CAPSULE | Refills: 0 | Status: SHIPPED | OUTPATIENT
Start: 2018-03-06 | End: 2018-05-03 | Stop reason: SDUPTHER

## 2018-03-27 DIAGNOSIS — G89.4 CHRONIC PAIN SYNDROME: ICD-10-CM

## 2018-03-27 DIAGNOSIS — M47.812 SPONDYLOSIS OF CERVICAL REGION WITHOUT MYELOPATHY OR RADICULOPATHY: ICD-10-CM

## 2018-03-27 RX ORDER — GABAPENTIN 300 MG/1
300 CAPSULE ORAL 2 TIMES DAILY
Qty: 60 CAPSULE | Refills: 0 | Status: SHIPPED | OUTPATIENT
Start: 2018-03-27 | End: 2018-07-05 | Stop reason: SDUPTHER

## 2018-04-05 DIAGNOSIS — G89.4 CHRONIC PAIN SYNDROME: Primary | ICD-10-CM

## 2018-04-05 RX ORDER — TRAMADOL HYDROCHLORIDE 50 MG/1
50 TABLET ORAL EVERY 8 HOURS PRN
Qty: 90 TABLET | Refills: 0 | Status: SHIPPED | OUTPATIENT
Start: 2018-04-05 | End: 2018-05-03 | Stop reason: SDUPTHER

## 2018-04-17 ENCOUNTER — OFFICE VISIT (OUTPATIENT)
Dept: PHYSICAL MEDICINE AND REHAB | Age: 53
End: 2018-04-17
Payer: COMMERCIAL

## 2018-04-17 VITALS
BODY MASS INDEX: 26.04 KG/M2 | HEART RATE: 71 BPM | HEIGHT: 65 IN | SYSTOLIC BLOOD PRESSURE: 122 MMHG | DIASTOLIC BLOOD PRESSURE: 73 MMHG | WEIGHT: 156.31 LBS

## 2018-04-17 DIAGNOSIS — G89.4 CHRONIC PAIN SYNDROME: ICD-10-CM

## 2018-04-17 DIAGNOSIS — M79.601 RIGHT ARM PAIN: ICD-10-CM

## 2018-04-17 DIAGNOSIS — M47.812 SPONDYLOSIS OF CERVICAL REGION WITHOUT MYELOPATHY OR RADICULOPATHY: Primary | ICD-10-CM

## 2018-04-17 PROCEDURE — 3017F COLORECTAL CA SCREEN DOC REV: CPT | Performed by: NURSE PRACTITIONER

## 2018-04-17 PROCEDURE — 4004F PT TOBACCO SCREEN RCVD TLK: CPT | Performed by: NURSE PRACTITIONER

## 2018-04-17 PROCEDURE — G8427 DOCREV CUR MEDS BY ELIG CLIN: HCPCS | Performed by: NURSE PRACTITIONER

## 2018-04-17 PROCEDURE — G8417 CALC BMI ABV UP PARAM F/U: HCPCS | Performed by: NURSE PRACTITIONER

## 2018-04-17 PROCEDURE — 99213 OFFICE O/P EST LOW 20 MIN: CPT | Performed by: NURSE PRACTITIONER

## 2018-04-17 PROCEDURE — 3014F SCREEN MAMMO DOC REV: CPT | Performed by: NURSE PRACTITIONER

## 2018-05-03 ENCOUNTER — ANESTHESIA EVENT (OUTPATIENT)
Dept: OPERATING ROOM | Age: 53
End: 2018-05-03
Payer: COMMERCIAL

## 2018-05-03 ENCOUNTER — APPOINTMENT (OUTPATIENT)
Dept: GENERAL RADIOLOGY | Age: 53
End: 2018-05-03
Attending: PAIN MEDICINE
Payer: COMMERCIAL

## 2018-05-03 ENCOUNTER — ANESTHESIA (OUTPATIENT)
Dept: OPERATING ROOM | Age: 53
End: 2018-05-03
Payer: COMMERCIAL

## 2018-05-03 ENCOUNTER — HOSPITAL ENCOUNTER (OUTPATIENT)
Age: 53
Setting detail: OUTPATIENT SURGERY
Discharge: HOME | End: 2018-05-03
Attending: PAIN MEDICINE | Admitting: PAIN MEDICINE
Payer: COMMERCIAL

## 2018-05-03 VITALS
OXYGEN SATURATION: 96 % | DIASTOLIC BLOOD PRESSURE: 62 MMHG | RESPIRATION RATE: 6 BRPM | SYSTOLIC BLOOD PRESSURE: 113 MMHG

## 2018-05-03 VITALS
SYSTOLIC BLOOD PRESSURE: 104 MMHG | RESPIRATION RATE: 11 BRPM | WEIGHT: 149.6 LBS | HEART RATE: 81 BPM | DIASTOLIC BLOOD PRESSURE: 52 MMHG | HEIGHT: 65 IN | TEMPERATURE: 97.6 F | OXYGEN SATURATION: 97 % | BODY MASS INDEX: 24.93 KG/M2

## 2018-05-03 DIAGNOSIS — G89.4 CHRONIC PAIN SYNDROME: ICD-10-CM

## 2018-05-03 DIAGNOSIS — M47.812 SPONDYLOSIS OF CERVICAL REGION WITHOUT MYELOPATHY OR RADICULOPATHY: ICD-10-CM

## 2018-05-03 PROCEDURE — 2500000003 HC RX 250 WO HCPCS: Performed by: PAIN MEDICINE

## 2018-05-03 PROCEDURE — 7100000001 HC PACU RECOVERY - ADDTL 15 MIN: Performed by: PAIN MEDICINE

## 2018-05-03 PROCEDURE — 2580000003 HC RX 258: Performed by: NURSE ANESTHETIST, CERTIFIED REGISTERED

## 2018-05-03 PROCEDURE — 3700000000 HC ANESTHESIA ATTENDED CARE: Performed by: PAIN MEDICINE

## 2018-05-03 PROCEDURE — 6360000002 HC RX W HCPCS: Performed by: PAIN MEDICINE

## 2018-05-03 PROCEDURE — 3600000057 HC PAIN LEVEL 4 ADDL 15 MIN: Performed by: PAIN MEDICINE

## 2018-05-03 PROCEDURE — 7100000000 HC PACU RECOVERY - FIRST 15 MIN: Performed by: PAIN MEDICINE

## 2018-05-03 PROCEDURE — 64634 DESTROY C/TH FACET JNT ADDL: CPT | Performed by: PAIN MEDICINE

## 2018-05-03 PROCEDURE — 64633 DESTROY CERV/THOR FACET JNT: CPT | Performed by: PAIN MEDICINE

## 2018-05-03 PROCEDURE — 3700000001 HC ADD 15 MINUTES (ANESTHESIA): Performed by: PAIN MEDICINE

## 2018-05-03 PROCEDURE — 7100000011 HC PHASE II RECOVERY - ADDTL 15 MIN: Performed by: PAIN MEDICINE

## 2018-05-03 PROCEDURE — 6360000002 HC RX W HCPCS: Performed by: NURSE ANESTHETIST, CERTIFIED REGISTERED

## 2018-05-03 PROCEDURE — 3600000056 HC PAIN LEVEL 4 BASE: Performed by: PAIN MEDICINE

## 2018-05-03 PROCEDURE — 3209999900 FLUORO FOR SURGICAL PROCEDURES

## 2018-05-03 PROCEDURE — 7100000010 HC PHASE II RECOVERY - FIRST 15 MIN: Performed by: PAIN MEDICINE

## 2018-05-03 RX ORDER — BUPIVACAINE HYDROCHLORIDE 2.5 MG/ML
INJECTION, SOLUTION EPIDURAL; INFILTRATION; INTRACAUDAL PRN
Status: DISCONTINUED | OUTPATIENT
Start: 2018-05-03 | End: 2018-05-03 | Stop reason: HOSPADM

## 2018-05-03 RX ORDER — DEXAMETHASONE SODIUM PHOSPHATE 4 MG/ML
INJECTION, SOLUTION INTRA-ARTICULAR; INTRALESIONAL; INTRAMUSCULAR; INTRAVENOUS; SOFT TISSUE CONTINUOUS PRN
Status: DISCONTINUED | OUTPATIENT
Start: 2018-05-03 | End: 2018-05-03 | Stop reason: HOSPADM

## 2018-05-03 RX ORDER — GABAPENTIN 300 MG/1
300 CAPSULE ORAL 2 TIMES DAILY
Qty: 60 CAPSULE | Refills: 0 | Status: SHIPPED | OUTPATIENT
Start: 2018-05-05 | End: 2018-06-08 | Stop reason: SDUPTHER

## 2018-05-03 RX ORDER — LIDOCAINE HYDROCHLORIDE 10 MG/ML
INJECTION, SOLUTION EPIDURAL; INFILTRATION; INTRACAUDAL; PERINEURAL PRN
Status: DISCONTINUED | OUTPATIENT
Start: 2018-05-03 | End: 2018-05-03 | Stop reason: HOSPADM

## 2018-05-03 RX ORDER — SODIUM CHLORIDE 9 MG/ML
INJECTION, SOLUTION INTRAVENOUS CONTINUOUS PRN
Status: DISCONTINUED | OUTPATIENT
Start: 2018-05-03 | End: 2018-05-03 | Stop reason: SDUPTHER

## 2018-05-03 RX ORDER — TRAMADOL HYDROCHLORIDE 50 MG/1
50 TABLET ORAL EVERY 8 HOURS PRN
Qty: 90 TABLET | Refills: 0 | Status: SHIPPED | OUTPATIENT
Start: 2018-05-05 | End: 2018-06-04

## 2018-05-03 RX ORDER — MIDAZOLAM HYDROCHLORIDE 1 MG/ML
INJECTION INTRAMUSCULAR; INTRAVENOUS PRN
Status: DISCONTINUED | OUTPATIENT
Start: 2018-05-03 | End: 2018-05-03 | Stop reason: SDUPTHER

## 2018-05-03 RX ORDER — PROPOFOL 10 MG/ML
INJECTION, EMULSION INTRAVENOUS PRN
Status: DISCONTINUED | OUTPATIENT
Start: 2018-05-03 | End: 2018-05-03 | Stop reason: SDUPTHER

## 2018-05-03 RX ORDER — FENTANYL CITRATE 50 UG/ML
INJECTION, SOLUTION INTRAMUSCULAR; INTRAVENOUS PRN
Status: DISCONTINUED | OUTPATIENT
Start: 2018-05-03 | End: 2018-05-03 | Stop reason: SDUPTHER

## 2018-05-03 RX ADMIN — MIDAZOLAM HYDROCHLORIDE 1 MG: 1 INJECTION, SOLUTION INTRAMUSCULAR; INTRAVENOUS at 12:06

## 2018-05-03 RX ADMIN — SODIUM CHLORIDE: 9 INJECTION, SOLUTION INTRAVENOUS at 11:59

## 2018-05-03 RX ADMIN — FENTANYL CITRATE 50 MCG: 50 INJECTION INTRAMUSCULAR; INTRAVENOUS at 11:59

## 2018-05-03 RX ADMIN — PROPOFOL 60 MG: 10 INJECTION, EMULSION INTRAVENOUS at 12:13

## 2018-05-03 RX ADMIN — FENTANYL CITRATE 50 MCG: 50 INJECTION INTRAMUSCULAR; INTRAVENOUS at 12:01

## 2018-05-03 ASSESSMENT — PULMONARY FUNCTION TESTS
PIF_VALUE: 0

## 2018-05-03 ASSESSMENT — LIFESTYLE VARIABLES: SMOKING_STATUS: 1

## 2018-05-03 ASSESSMENT — PAIN - FUNCTIONAL ASSESSMENT: PAIN_FUNCTIONAL_ASSESSMENT: 0-10

## 2018-05-03 ASSESSMENT — PAIN SCALES - GENERAL: PAINLEVEL_OUTOF10: 8

## 2018-05-15 ENCOUNTER — TELEPHONE (OUTPATIENT)
Dept: PHYSICAL MEDICINE AND REHAB | Age: 53
End: 2018-05-15

## 2018-06-08 DIAGNOSIS — G89.4 CHRONIC PAIN SYNDROME: ICD-10-CM

## 2018-06-08 DIAGNOSIS — M47.812 SPONDYLOSIS OF CERVICAL REGION WITHOUT MYELOPATHY OR RADICULOPATHY: ICD-10-CM

## 2018-06-08 RX ORDER — GABAPENTIN 300 MG/1
300 CAPSULE ORAL 2 TIMES DAILY
Qty: 60 CAPSULE | Refills: 0 | Status: ON HOLD | OUTPATIENT
Start: 2018-06-08 | End: 2018-07-12 | Stop reason: SDUPTHER

## 2018-06-13 ENCOUNTER — OFFICE VISIT (OUTPATIENT)
Dept: PHYSICAL MEDICINE AND REHAB | Age: 53
End: 2018-06-13
Payer: COMMERCIAL

## 2018-06-13 VITALS
HEIGHT: 65 IN | HEART RATE: 77 BPM | BODY MASS INDEX: 24.94 KG/M2 | DIASTOLIC BLOOD PRESSURE: 69 MMHG | WEIGHT: 149.69 LBS | SYSTOLIC BLOOD PRESSURE: 112 MMHG

## 2018-06-13 DIAGNOSIS — G89.4 CHRONIC PAIN SYNDROME: ICD-10-CM

## 2018-06-13 DIAGNOSIS — M47.812 SPONDYLOSIS OF CERVICAL REGION WITHOUT MYELOPATHY OR RADICULOPATHY: Primary | ICD-10-CM

## 2018-06-13 DIAGNOSIS — M54.50 CHRONIC BILATERAL LOW BACK PAIN WITHOUT SCIATICA: ICD-10-CM

## 2018-06-13 DIAGNOSIS — G89.29 CHRONIC BILATERAL LOW BACK PAIN WITHOUT SCIATICA: ICD-10-CM

## 2018-06-13 PROCEDURE — 3017F COLORECTAL CA SCREEN DOC REV: CPT | Performed by: NURSE PRACTITIONER

## 2018-06-13 PROCEDURE — G8420 CALC BMI NORM PARAMETERS: HCPCS | Performed by: NURSE PRACTITIONER

## 2018-06-13 PROCEDURE — G8427 DOCREV CUR MEDS BY ELIG CLIN: HCPCS | Performed by: NURSE PRACTITIONER

## 2018-06-13 PROCEDURE — 4004F PT TOBACCO SCREEN RCVD TLK: CPT | Performed by: NURSE PRACTITIONER

## 2018-06-13 PROCEDURE — 99213 OFFICE O/P EST LOW 20 MIN: CPT | Performed by: NURSE PRACTITIONER

## 2018-06-13 RX ORDER — TRAMADOL HYDROCHLORIDE 50 MG/1
50 TABLET ORAL EVERY 8 HOURS PRN
COMMUNITY
End: 2018-07-05 | Stop reason: SDUPTHER

## 2018-06-13 ASSESSMENT — ENCOUNTER SYMPTOMS: BACK PAIN: 1

## 2018-06-14 ENCOUNTER — TELEPHONE (OUTPATIENT)
Dept: PHYSICAL MEDICINE AND REHAB | Age: 53
End: 2018-06-14

## 2018-06-20 DIAGNOSIS — G89.29 CHRONIC BILATERAL LOW BACK PAIN WITHOUT SCIATICA: ICD-10-CM

## 2018-06-20 DIAGNOSIS — M54.50 CHRONIC BILATERAL LOW BACK PAIN WITHOUT SCIATICA: ICD-10-CM

## 2018-07-05 DIAGNOSIS — G89.4 CHRONIC PAIN SYNDROME: Primary | ICD-10-CM

## 2018-07-05 RX ORDER — TRAMADOL HYDROCHLORIDE 50 MG/1
50 TABLET ORAL EVERY 8 HOURS PRN
Qty: 90 TABLET | Refills: 0 | Status: SHIPPED | OUTPATIENT
Start: 2018-07-05 | End: 2018-08-04

## 2018-07-12 ENCOUNTER — ANESTHESIA (OUTPATIENT)
Dept: OPERATING ROOM | Age: 53
End: 2018-07-12
Payer: COMMERCIAL

## 2018-07-12 ENCOUNTER — APPOINTMENT (OUTPATIENT)
Dept: GENERAL RADIOLOGY | Age: 53
End: 2018-07-12
Attending: PAIN MEDICINE
Payer: COMMERCIAL

## 2018-07-12 ENCOUNTER — ANESTHESIA EVENT (OUTPATIENT)
Dept: OPERATING ROOM | Age: 53
End: 2018-07-12
Payer: COMMERCIAL

## 2018-07-12 ENCOUNTER — HOSPITAL ENCOUNTER (OUTPATIENT)
Age: 53
Setting detail: OUTPATIENT SURGERY
Discharge: HOME | End: 2018-07-12
Attending: PAIN MEDICINE | Admitting: PAIN MEDICINE
Payer: COMMERCIAL

## 2018-07-12 VITALS
TEMPERATURE: 98.1 F | DIASTOLIC BLOOD PRESSURE: 54 MMHG | RESPIRATION RATE: 14 BRPM | OXYGEN SATURATION: 93 % | BODY MASS INDEX: 24.83 KG/M2 | HEIGHT: 65 IN | HEART RATE: 89 BPM | WEIGHT: 149 LBS | SYSTOLIC BLOOD PRESSURE: 112 MMHG

## 2018-07-12 VITALS
RESPIRATION RATE: 11 BRPM | OXYGEN SATURATION: 98 % | SYSTOLIC BLOOD PRESSURE: 98 MMHG | DIASTOLIC BLOOD PRESSURE: 53 MMHG

## 2018-07-12 DIAGNOSIS — G89.4 CHRONIC PAIN SYNDROME: ICD-10-CM

## 2018-07-12 DIAGNOSIS — M47.812 SPONDYLOSIS OF CERVICAL REGION WITHOUT MYELOPATHY OR RADICULOPATHY: ICD-10-CM

## 2018-07-12 PROCEDURE — 7100000010 HC PHASE II RECOVERY - FIRST 15 MIN: Performed by: PAIN MEDICINE

## 2018-07-12 PROCEDURE — 3700000001 HC ADD 15 MINUTES (ANESTHESIA): Performed by: PAIN MEDICINE

## 2018-07-12 PROCEDURE — 2500000003 HC RX 250 WO HCPCS: Performed by: PAIN MEDICINE

## 2018-07-12 PROCEDURE — 3600000056 HC PAIN LEVEL 4 BASE: Performed by: PAIN MEDICINE

## 2018-07-12 PROCEDURE — 64634 DESTROY C/TH FACET JNT ADDL: CPT | Performed by: PAIN MEDICINE

## 2018-07-12 PROCEDURE — 3600000057 HC PAIN LEVEL 4 ADDL 15 MIN: Performed by: PAIN MEDICINE

## 2018-07-12 PROCEDURE — 6360000002 HC RX W HCPCS: Performed by: PAIN MEDICINE

## 2018-07-12 PROCEDURE — 7100000011 HC PHASE II RECOVERY - ADDTL 15 MIN: Performed by: PAIN MEDICINE

## 2018-07-12 PROCEDURE — 2580000003 HC RX 258: Performed by: NURSE ANESTHETIST, CERTIFIED REGISTERED

## 2018-07-12 PROCEDURE — 6360000002 HC RX W HCPCS: Performed by: NURSE ANESTHETIST, CERTIFIED REGISTERED

## 2018-07-12 PROCEDURE — 3700000000 HC ANESTHESIA ATTENDED CARE: Performed by: PAIN MEDICINE

## 2018-07-12 PROCEDURE — 3209999900 FLUORO FOR SURGICAL PROCEDURES

## 2018-07-12 PROCEDURE — 2500000003 HC RX 250 WO HCPCS: Performed by: NURSE ANESTHETIST, CERTIFIED REGISTERED

## 2018-07-12 PROCEDURE — 64633 DESTROY CERV/THOR FACET JNT: CPT | Performed by: PAIN MEDICINE

## 2018-07-12 RX ORDER — LIDOCAINE HYDROCHLORIDE 10 MG/ML
INJECTION, SOLUTION INFILTRATION; PERINEURAL PRN
Status: DISCONTINUED | OUTPATIENT
Start: 2018-07-12 | End: 2018-07-12 | Stop reason: SDUPTHER

## 2018-07-12 RX ORDER — LIDOCAINE HYDROCHLORIDE 10 MG/ML
INJECTION, SOLUTION EPIDURAL; INFILTRATION; INTRACAUDAL; PERINEURAL PRN
Status: DISCONTINUED | OUTPATIENT
Start: 2018-07-12 | End: 2018-07-12 | Stop reason: HOSPADM

## 2018-07-12 RX ORDER — GABAPENTIN 300 MG/1
300 CAPSULE ORAL 2 TIMES DAILY
Qty: 60 CAPSULE | Refills: 0 | Status: SHIPPED | OUTPATIENT
Start: 2018-07-12 | End: 2018-09-06 | Stop reason: SDUPTHER

## 2018-07-12 RX ORDER — SODIUM CHLORIDE 9 MG/ML
INJECTION, SOLUTION INTRAVENOUS CONTINUOUS PRN
Status: DISCONTINUED | OUTPATIENT
Start: 2018-07-12 | End: 2018-07-12 | Stop reason: SDUPTHER

## 2018-07-12 RX ORDER — DEXAMETHASONE SODIUM PHOSPHATE 4 MG/ML
INJECTION, SOLUTION INTRA-ARTICULAR; INTRALESIONAL; INTRAMUSCULAR; INTRAVENOUS; SOFT TISSUE PRN
Status: DISCONTINUED | OUTPATIENT
Start: 2018-07-12 | End: 2018-07-12 | Stop reason: HOSPADM

## 2018-07-12 RX ORDER — FENTANYL CITRATE 50 UG/ML
INJECTION, SOLUTION INTRAMUSCULAR; INTRAVENOUS PRN
Status: DISCONTINUED | OUTPATIENT
Start: 2018-07-12 | End: 2018-07-12 | Stop reason: SDUPTHER

## 2018-07-12 RX ORDER — BUPIVACAINE HYDROCHLORIDE 2.5 MG/ML
INJECTION, SOLUTION EPIDURAL; INFILTRATION; INTRACAUDAL PRN
Status: DISCONTINUED | OUTPATIENT
Start: 2018-07-12 | End: 2018-07-12 | Stop reason: HOSPADM

## 2018-07-12 RX ORDER — PROPOFOL 10 MG/ML
INJECTION, EMULSION INTRAVENOUS PRN
Status: DISCONTINUED | OUTPATIENT
Start: 2018-07-12 | End: 2018-07-12 | Stop reason: SDUPTHER

## 2018-07-12 RX ADMIN — FENTANYL CITRATE 50 MCG: 50 INJECTION INTRAMUSCULAR; INTRAVENOUS at 09:53

## 2018-07-12 RX ADMIN — FENTANYL CITRATE 50 MCG: 50 INJECTION INTRAMUSCULAR; INTRAVENOUS at 09:59

## 2018-07-12 RX ADMIN — SODIUM CHLORIDE: 9 INJECTION, SOLUTION INTRAVENOUS at 09:49

## 2018-07-12 RX ADMIN — PROPOFOL 50 MG: 10 INJECTION, EMULSION INTRAVENOUS at 10:16

## 2018-07-12 RX ADMIN — LIDOCAINE HYDROCHLORIDE 20 MG: 10 INJECTION, SOLUTION INFILTRATION; PERINEURAL at 10:16

## 2018-07-12 ASSESSMENT — PULMONARY FUNCTION TESTS
PIF_VALUE: 0

## 2018-07-12 ASSESSMENT — PAIN DESCRIPTION - ONSET: ONSET: ON-GOING

## 2018-07-12 ASSESSMENT — PAIN SCALES - GENERAL: PAINLEVEL_OUTOF10: 4

## 2018-07-12 ASSESSMENT — PAIN DESCRIPTION - DESCRIPTORS
DESCRIPTORS: ACHING;CONSTANT;DISCOMFORT
DESCRIPTORS: ACHING

## 2018-07-12 ASSESSMENT — PAIN DESCRIPTION - ORIENTATION: ORIENTATION: RIGHT

## 2018-07-12 ASSESSMENT — LIFESTYLE VARIABLES: SMOKING_STATUS: 1

## 2018-07-12 ASSESSMENT — PAIN - FUNCTIONAL ASSESSMENT: PAIN_FUNCTIONAL_ASSESSMENT: 0-10

## 2018-07-12 ASSESSMENT — PAIN DESCRIPTION - LOCATION: LOCATION: NECK

## 2018-07-12 ASSESSMENT — PAIN DESCRIPTION - PROGRESSION: CLINICAL_PROGRESSION: NOT CHANGED

## 2018-07-12 ASSESSMENT — PAIN DESCRIPTION - FREQUENCY: FREQUENCY: CONTINUOUS

## 2018-07-12 NOTE — ANESTHESIA PRE PROCEDURE
Department of Anesthesiology  Preprocedure Note       Name:  Lachelle Del Castillo   Age:  47 y.o.  :  1965                                          MRN:  [id number]         Date:  2018      Surgeon: Armando Magana):  Tatianna Tang MD    Procedure: Procedure(s):  C-RFA  @ V3-8,2-0,7-1. LEFT SIDE FIRST. Medications prior to admission:   Prior to Admission medications    Medication Sig Start Date End Date Taking? Authorizing Provider   gabapentin (NEURONTIN) 300 MG capsule Take 1 capsule by mouth 2 times daily for 30 days. . 18  TERELL Tariq - CNP   traMADol (ULTRAM) 50 MG tablet Take 1 tablet by mouth every 8 hours as needed for Pain for up to 30 days. . 18  TERELL Pepe - CNP   cyclobenzaprine (FLEXERIL) 5 MG tablet Take 5 mg by mouth 2 times daily as needed  16   Historical Provider, MD       Current medications:    Current Outpatient Prescriptions   Medication Sig Dispense Refill    gabapentin (NEURONTIN) 300 MG capsule Take 1 capsule by mouth 2 times daily for 30 days. . 60 capsule 0     No current facility-administered medications for this visit. Allergies:     Allergies   Allergen Reactions    Strawberry (Diagnostic) Swelling       Problem List:    Patient Active Problem List   Diagnosis Code    Spondylosis of cervical region without myelopathy or radiculopathy M47.812       Past Medical History:        Diagnosis Date    Herniated disc, cervical     x2       Past Surgical History:        Procedure Laterality Date    APPENDECTOMY      NERVE SURGERY Left 2017    CERVICAL RFA C4-5, C5-6, C6-7    OTHER SURGICAL HISTORY Bilateral 2017    CERVICAL FACET MBB C4-5, C5-6, C6-7    OTHER SURGICAL HISTORY Bilateral 2017    Cervical Facet Block C4-5, C5-6, C6-7 Bilateral    OTHER SURGICAL HISTORY Right 2017    Cervical Facet MBB RFA C4-5, C5-6, C6-7     OTHER SURGICAL HISTORY Left 2018    Cervical RFA at C4-5, C5-6, C6-7    LA DEST,PARAVERTEBRAL,C/T,SINGLE Left 8/1/2017    CERVICAL RFA C4-5, C5-6, C6-7, LEFT performed by Sha Lin MD at [address] [place], [place] [address] Right 11/7/2017    RIGHT SIDE C-FACET RFA @ C4-5, C5-6, C6-7 performed by Sha Lin MD at [place] [address] AGNT PARVERTEB FCT SNGL CRVCL/THORA Left 5/3/2018    C-RFA  @ C4-5,5-6,6-7. LEFT SIDE FIRST. performed by Sha Lin MD at [place] [address] Left     TONSILLECTOMY      TUBAL LIGATION         Social History:    Social History   Substance Use Topics    Smoking status: Current Every Day Smoker     Packs/day: 1.00     Years: 30.00     Types: Cigarettes    Smokeless tobacco: Never Used    Alcohol use No      Comment: rarely                                Ready to quit: Not Answered  Counseling given: Not Answered      Vital Signs (Current): There were no vitals filed for this visit. BP Readings from Last 3 Encounters:   07/12/18 134/61   06/13/18 112/69   05/03/18 (!) 104/52       NPO Status:                                                                                 BMI:   Wt Readings from Last 3 Encounters:   07/12/18 149 lb (67.6 kg)   06/13/18 149 lb 11.1 oz (67.9 kg)   05/03/18 149 lb 9.6 oz (67.9 kg)     There is no height or weight on file to calculate BMI.    CBC: No results found for: WBC, RBC, HGB, HCT, MCV, RDW, PLT    CMP: No results found for: NA, K, CL, CO2, BUN, CREATININE, GFRAA, AGRATIO, LABGLOM, GLUCOSE, PROT, CALCIUM, BILITOT, ALKPHOS, AST, ALT    POC Tests: No results for input(s): POCGLU, POCNA, POCK, POCCL, POCBUN, POCHEMO, POCHCT in the last 72 hours.     Coags: No results found for: PROTIME, INR, APTT    HCG (If Applicable): No results found for: PREGTESTUR, PREGSERUM, HCG, HCGQUANT     ABGs: No results found for: PHART, PO2ART, CCK5ZRQ, ZTT3ETG, BEART, M6FRBGWB     Type & Screen

## 2018-08-09 DIAGNOSIS — G89.4 CHRONIC PAIN SYNDROME: Primary | ICD-10-CM

## 2018-08-09 RX ORDER — TRAMADOL HYDROCHLORIDE 50 MG/1
50 TABLET ORAL EVERY 8 HOURS PRN
Qty: 90 TABLET | Refills: 0 | Status: SHIPPED | OUTPATIENT
Start: 2018-08-09 | End: 2018-09-06 | Stop reason: SDUPTHER

## 2018-08-13 ENCOUNTER — OFFICE VISIT (OUTPATIENT)
Dept: PHYSICAL MEDICINE AND REHAB | Age: 53
End: 2018-08-13
Payer: COMMERCIAL

## 2018-08-13 VITALS
BODY MASS INDEX: 25.66 KG/M2 | HEIGHT: 65 IN | DIASTOLIC BLOOD PRESSURE: 66 MMHG | WEIGHT: 154 LBS | SYSTOLIC BLOOD PRESSURE: 119 MMHG | HEART RATE: 86 BPM

## 2018-08-13 DIAGNOSIS — M79.601 RIGHT ARM PAIN: ICD-10-CM

## 2018-08-13 DIAGNOSIS — R20.0 NUMBNESS ON RIGHT SIDE: Primary | ICD-10-CM

## 2018-08-13 DIAGNOSIS — G89.4 CHRONIC PAIN SYNDROME: ICD-10-CM

## 2018-08-13 DIAGNOSIS — M54.50 CHRONIC BILATERAL LOW BACK PAIN WITHOUT SCIATICA: ICD-10-CM

## 2018-08-13 DIAGNOSIS — G89.29 CHRONIC BILATERAL LOW BACK PAIN WITHOUT SCIATICA: ICD-10-CM

## 2018-08-13 DIAGNOSIS — R20.0 NUMBNESS: ICD-10-CM

## 2018-08-13 DIAGNOSIS — M47.812 SPONDYLOSIS OF CERVICAL REGION WITHOUT MYELOPATHY OR RADICULOPATHY: ICD-10-CM

## 2018-08-13 PROCEDURE — 3017F COLORECTAL CA SCREEN DOC REV: CPT | Performed by: NURSE PRACTITIONER

## 2018-08-13 PROCEDURE — 4004F PT TOBACCO SCREEN RCVD TLK: CPT | Performed by: NURSE PRACTITIONER

## 2018-08-13 PROCEDURE — 99213 OFFICE O/P EST LOW 20 MIN: CPT | Performed by: NURSE PRACTITIONER

## 2018-08-13 PROCEDURE — G8427 DOCREV CUR MEDS BY ELIG CLIN: HCPCS | Performed by: NURSE PRACTITIONER

## 2018-08-13 PROCEDURE — G8417 CALC BMI ABV UP PARAM F/U: HCPCS | Performed by: NURSE PRACTITIONER

## 2018-08-13 RX ORDER — METHYLPREDNISOLONE 4 MG/1
TABLET ORAL
Qty: 1 KIT | Refills: 0 | Status: SHIPPED | OUTPATIENT
Start: 2018-08-13 | End: 2018-08-19

## 2018-08-13 ASSESSMENT — ENCOUNTER SYMPTOMS: BACK PAIN: 1

## 2018-08-13 NOTE — PROGRESS NOTES
[address] [facility] REHABILITATION [place]  [address] [facility] [address].  Dept: [phone]  Dept Fax: [phone]  Loc: [phone]    Visit Date: 8/13/2018    Functionality Assessment/Goals Worksheet     On a scale of 0 (Does not Interfere) to 10 (Completely Interferes)     1. Which number describes how during the past week pain has interfered with       the following:  A. General Activity:  8  B. Mood: 8  C. Walking Ability:  8  D. Normal Work (Includes both work outside the home and housework):  8  E. Relations with Other People:   7  F. Sleep:   8  G. Enjoyment of Life:   8    2. Patient Prefers to Take their Pain Medications:     [x]  On a regular basis   []  Only when necessary    []  Does not take pain medications    3. What are the Patient's Goals/Expectations for Visiting Pain Management? [x]  Learn about my pain    []  Receive Medication   []  Physical Therapy     []  Treat Depression   []  Receive Injections    []  Treat Sleep   []  Deal with Anxiety and Stress   []  Treat Opoid Dependence/Addiction   []  Other:     \"Numbness on right side of body\"      HPI:   Basia Reynoso is a 46 y.o. female is here today for    Chief Complaint: Neck pain, lower back pain     HPI   FU Bilateral C-RFA, left side from 5/3/2018 and right side from 7/12/2018. Continues to receive over 75% relief on left side from injection. But reports since right side injection patient is having increased pain and numbness on entire right side of body, neck, right arm, right leg. States happened after procedure. Not ambulating with any assist devices   Continues to have lower back pain  Continues to work at [employer] helps take the edge off. Taking Neurontin BID. Medications reviewed. Patient denies  side effects with medications. Patient states she is  taking medications as prescribed.  She denies receiving pain medications from other sources. She denies any ER visits since last visit. Pain scale with out pain medications is 8/10. Pain scale with pain medications is  5/10. Last dose of Ultram and Neurontin was today  Drug screen reviewed from 6/13/2018 and was appropriate  Pill count was not completed today- instructed to bring at next visit     Lumbar x-ray- Normal     The patient is allergic to strawberry (diagnostic). Past Medical History  Charmayne Ferry  has a past medical history of Herniated disc, cervical.    Past Surgical History  The patient  has a past surgical history that includes Tubal ligation; Appendectomy; Rotator cuff repair (Left); Tonsillectomy; other surgical history (Bilateral, 03/06/2017); other surgical history (Bilateral, 05/02/2017); Nerve Surgery (Left, 08/01/2017); pr dest,paravertebral,c/t,single (Left, 8/1/2017); other surgical history (Right, 11/07/2017); pr dest,paravertebral,c/t,single (Right, 11/7/2017); other surgical history (Left, 05/03/2018); pr dstr nrolytc agnt parverteb fct sngl crvcl/thora (Left, 5/3/2018); and pr dstr nrolytc agnt parverteb fct sngl crvcl/thora (Right, 7/12/2018). Family History  This patient's family history includes Cancer in her father and another family member; Dementia in her mother; Diabetes in her mother; Stroke in her mother. Social History  Charmayne Ferry  reports that she has been smoking Cigarettes. She has a 30.00 pack-year smoking history. She has never used smokeless tobacco. She reports that she does not drink alcohol or use drugs. Medications    Current Outpatient Prescriptions:     methylPREDNISolone (MEDROL DOSEPACK) 4 MG tablet, Take by mouth., Disp: 1 kit, Rfl: 0    traMADol (ULTRAM) 50 MG tablet, Take 1 tablet by mouth every 8 hours as needed for Pain for up to 30 days. ., Disp: 90 tablet, Rfl: 0    cyclobenzaprine (FLEXERIL) 5 MG tablet, Take 5 mg by mouth 2 times daily as needed , Disp: , Rfl:     gabapentin (NEURONTIN) 300 MG capsule, Take 1 capsule by mouth

## 2018-09-06 DIAGNOSIS — G89.4 CHRONIC PAIN SYNDROME: ICD-10-CM

## 2018-09-06 DIAGNOSIS — M47.812 SPONDYLOSIS OF CERVICAL REGION WITHOUT MYELOPATHY OR RADICULOPATHY: ICD-10-CM

## 2018-09-06 RX ORDER — GABAPENTIN 300 MG/1
300 CAPSULE ORAL 2 TIMES DAILY
Qty: 60 CAPSULE | Refills: 0 | Status: SHIPPED | OUTPATIENT
Start: 2018-09-06 | End: 2018-10-08 | Stop reason: SDUPTHER

## 2018-09-06 RX ORDER — TRAMADOL HYDROCHLORIDE 50 MG/1
50 TABLET ORAL EVERY 8 HOURS PRN
Qty: 90 TABLET | Refills: 0 | Status: SHIPPED | OUTPATIENT
Start: 2018-09-08 | End: 2018-10-08 | Stop reason: SDUPTHER

## 2018-09-06 NOTE — TELEPHONE ENCOUNTER
OARRS reviewed. UDS: consistent. Last seen: 08.13.18.  Follow-up: Future Appointments  Date Time Provider Rhys Garner   9/13/2018 8:50 AM Moise Curling, APRN - CNP [facility] - [place]

## 2018-09-13 ENCOUNTER — OFFICE VISIT (OUTPATIENT)
Dept: PHYSICAL MEDICINE AND REHAB | Age: 53
End: 2018-09-13
Payer: COMMERCIAL

## 2018-09-13 ENCOUNTER — TELEPHONE (OUTPATIENT)
Dept: PHYSICAL MEDICINE AND REHAB | Age: 53
End: 2018-09-13

## 2018-09-13 VITALS
WEIGHT: 150 LBS | SYSTOLIC BLOOD PRESSURE: 116 MMHG | HEART RATE: 71 BPM | HEIGHT: 65 IN | BODY MASS INDEX: 24.99 KG/M2 | DIASTOLIC BLOOD PRESSURE: 72 MMHG

## 2018-09-13 DIAGNOSIS — M48.02 CERVICAL STENOSIS OF SPINAL CANAL: ICD-10-CM

## 2018-09-13 DIAGNOSIS — M47.812 SPONDYLOSIS OF CERVICAL REGION WITHOUT MYELOPATHY OR RADICULOPATHY: ICD-10-CM

## 2018-09-13 DIAGNOSIS — M54.12 CERVICAL RADICULITIS: Primary | ICD-10-CM

## 2018-09-13 DIAGNOSIS — G89.4 CHRONIC PAIN SYNDROME: ICD-10-CM

## 2018-09-13 DIAGNOSIS — M79.601 RIGHT ARM PAIN: ICD-10-CM

## 2018-09-13 PROCEDURE — 4004F PT TOBACCO SCREEN RCVD TLK: CPT | Performed by: NURSE PRACTITIONER

## 2018-09-13 PROCEDURE — 99213 OFFICE O/P EST LOW 20 MIN: CPT | Performed by: NURSE PRACTITIONER

## 2018-09-13 PROCEDURE — 3017F COLORECTAL CA SCREEN DOC REV: CPT | Performed by: NURSE PRACTITIONER

## 2018-09-13 PROCEDURE — G8427 DOCREV CUR MEDS BY ELIG CLIN: HCPCS | Performed by: NURSE PRACTITIONER

## 2018-09-13 PROCEDURE — G8420 CALC BMI NORM PARAMETERS: HCPCS | Performed by: NURSE PRACTITIONER

## 2018-09-13 RX ORDER — ONDANSETRON 4 MG/1
4 TABLET, FILM COATED ORAL EVERY 8 HOURS PRN
COMMUNITY

## 2018-09-13 NOTE — PROGRESS NOTES
[facility] [address] REHABILITATION [place]  [address] [facility] 56.  Dept: [phone]  Dept Fax: [phone]  Loc: [phone]    Visit Date: 9/13/2018    Functionality Assessment/Goals Worksheet     On a scale of 0 (Does not Interfere) to 10 (Completely Interferes)     1. Which number describes how during the past week pain has interfered with       the following:  A. General Activity:  5  B. Mood: 7  C. Walking Ability:  5  D. Normal Work (Includes both work outside the home and housework):  7  E. Relations with Other People:   5  F. Sleep:   5  G. Enjoyment of Life:   6    2. Patient Prefers to Take their Pain Medications:     [x]  On a regular basis   []  Only when necessary    []  Does not take pain medications    3. What are the Patient's Goals/Expectations for Visiting Pain Management? [x]  Learn about my pain    []  Receive Medication   []  Physical Therapy     []  Treat Depression   []  Receive Injections    []  Treat Sleep   []  Deal with Anxiety and Stress   []  Treat Opoid Dependence/Addiction   []  Other:       HPI:   Yaritza Tavares is a 46 y.o. female is here today for    Chief Complaint: Neck pain    HPI   1 month FU. Continues to have neck pain mainly right side radiating to shoulder blade. Still receiving 75% relief from Left C-RFA but none on right. Has nomeness in right arm. Denies any lower back pain today. Currently participating in therapy which is helping   Continues to work at [employer] helps take the edge off. Neurontin helps still only taking it BID     Medications reviewed. Patient denies  side effects with medications. Patient states she is  taking medications as prescribed. She denies receiving pain medications from other sources. She denies any ER visits since last visit. Pain scale with out pain medications is 8/10. Pain scale with pain medications is  5/10.   Last dose of Ultram and Neurontin  was today  Drug screen reviewed from 8/13/2018 and was appropriate  Pill count was completed today and was appropriate    The patient is allergic to strawberry (diagnostic). Past Medical History  Fredy  has a past medical history of Herniated disc, cervical.    Past Surgical History  The patient  has a past surgical history that includes Tubal ligation; Appendectomy; Rotator cuff repair (Left); Tonsillectomy; other surgical history (Bilateral, 03/06/2017); other surgical history (Bilateral, 05/02/2017); Nerve Surgery (Left, 08/01/2017); pr dest,paravertebral,c/t,single (Left, 8/1/2017); other surgical history (Right, 11/07/2017); pr dest,paravertebral,c/t,single (Right, 11/7/2017); other surgical history (Left, 05/03/2018); pr dstr nrolytc agnt parverteb fct sngl crvcl/thora (Left, 5/3/2018); and pr dstr nrolytc agnt parverteb fct sngl crvcl/thora (Right, 7/12/2018). Family History  This patient's family history includes Cancer in her father and another family member; Dementia in her mother; Diabetes in her mother; Stroke in her mother. Social History  [place]  reports that she has been smoking Cigarettes. She has a 30.00 pack-year smoking history. She has never used smokeless tobacco. She reports that she does not drink alcohol or use drugs. Medications    Current Outpatient Prescriptions:     ondansetron (ZOFRAN) 4 MG tablet, Take 4 mg by mouth every 8 hours as needed for Nausea or Vomiting, Disp: , Rfl:     gabapentin (NEURONTIN) 300 MG capsule, Take 1 capsule by mouth 2 times daily for 30 days. ., Disp: 60 capsule, Rfl: 0    traMADol (ULTRAM) 50 MG tablet, Take 1 tablet by mouth every 8 hours as needed for Pain for up to 30 days. ., Disp: 90 tablet, Rfl: 0    cyclobenzaprine (FLEXERIL) 5 MG tablet, Take 5 mg by mouth 2 times daily as needed , Disp: , Rfl:     Subjective:      Review of Systems   Musculoskeletal: Positive for arthralgias, myalgias, neck pain and neck stiffness.

## 2018-09-27 ENCOUNTER — TELEPHONE (OUTPATIENT)
Dept: OPERATING ROOM | Age: 53
End: 2018-09-27

## 2018-10-08 DIAGNOSIS — M47.812 SPONDYLOSIS OF CERVICAL REGION WITHOUT MYELOPATHY OR RADICULOPATHY: ICD-10-CM

## 2018-10-08 DIAGNOSIS — G89.4 CHRONIC PAIN SYNDROME: ICD-10-CM

## 2018-10-08 RX ORDER — GABAPENTIN 300 MG/1
300 CAPSULE ORAL 3 TIMES DAILY
Qty: 120 CAPSULE | Refills: 0 | Status: SHIPPED | OUTPATIENT
Start: 2018-10-08 | End: 2018-11-06 | Stop reason: SDUPTHER

## 2018-10-08 RX ORDER — GABAPENTIN 300 MG/1
300 CAPSULE ORAL 2 TIMES DAILY
Qty: 60 CAPSULE | Refills: 0 | Status: SHIPPED | OUTPATIENT
Start: 2018-10-08 | End: 2018-10-08 | Stop reason: SDUPTHER

## 2018-10-08 RX ORDER — TRAMADOL HYDROCHLORIDE 50 MG/1
50 TABLET ORAL EVERY 8 HOURS PRN
Qty: 90 TABLET | Refills: 0 | Status: SHIPPED | OUTPATIENT
Start: 2018-10-08 | End: 2018-11-06 | Stop reason: SDUPTHER

## 2018-10-23 ENCOUNTER — SURG/PROC ORDERS (OUTPATIENT)
Dept: PHYSICAL MEDICINE AND REHAB | Age: 53
End: 2018-10-23

## 2018-10-23 NOTE — H&P
Lydia Gallo is an 46 y.o.  female. Chief Complaint: Neck pain        The patient is allergic to strawberry (diagnostic). Past Medical History  Maegan Chau  has a past medical history of Herniated disc, cervical.     Past Surgical History  The patient  has a past surgical history that includes Tubal ligation; Appendectomy; Rotator cuff repair (Left); Tonsillectomy; other surgical history (Bilateral, 03/06/2017); other surgical history (Bilateral, 05/02/2017); Nerve Surgery (Left, 08/01/2017); pr dest,paravertebral,c/t,single (Left, 8/1/2017); other surgical history (Right, 11/07/2017); pr dest,paravertebral,c/t,single (Right, 11/7/2017); other surgical history (Left, 05/03/2018); pr dstr nrolytc agnt parverteb fct sngl crvcl/thora (Left, 5/3/2018); and pr dstr nrolytc agnt parverteb fct sngl crvcl/thora (Right, 7/12/2018). Family History  This patient's family history includes Cancer in her father and another family member; Dementia in her mother; Diabetes in her mother; Stroke in her mother. Social History  Maegan Chau  reports that she has been smoking Cigarettes. She has a 30.00 pack-year smoking history. She has never used smokeless tobacco. She reports that she does not drink alcohol or use drugs. Medications    Current Medication      Current Outpatient Prescriptions:     ondansetron (ZOFRAN) 4 MG tablet, Take 4 mg by mouth every 8 hours as needed for Nausea or Vomiting, Disp: , Rfl:     gabapentin (NEURONTIN) 300 MG capsule, Take 1 capsule by mouth 2 times daily for 30 days. ., Disp: 60 capsule, Rfl: 0    traMADol (ULTRAM) 50 MG tablet, Take 1 tablet by mouth every 8 hours as needed for Pain for up to 30 days. ., Disp: 90 tablet, Rfl: 0    cyclobenzaprine (FLEXERIL) 5 MG tablet, Take 5 mg by mouth 2 times daily as needed , Disp: , Rfl:         Subjective:      Review of Systems   Musculoskeletal: Positive for arthralgias, myalgias, neck pain and neck stiffness.    Neurological: Positive for weakness upper leg: Normal.        Right lower leg: Normal.        Left lower leg: Normal.        Right foot: Normal.        Left foot: Normal.   Neurological: She is alert and oriented to person, place, and time. She has normal strength. She is not disoriented. She displays no atrophy. A sensory deficit is present. No cranial nerve deficit. She exhibits normal muscle tone. She displays a negative Romberg sign. Coordination and gait normal. She displays no Babinski's sign on the right side. Reflex Scores:       Tricep reflexes are 1+ on the right side and 2+ on the left side. Bicep reflexes are 1+ on the right side and 2+ on the left side. Brachioradialis reflexes are 1+ on the right side and 2+ on the left side. Patellar reflexes are 2+ on the right side and 2+ on the left side. Achilles reflexes are 2+ on the right side and 2+ on the left side. SLR-  Motor 5/5 lower extremities, 4/5 strength bilateral upper extremities   numbness in right hand      Skin: Skin is warm. No rash noted. She is not diaphoretic. No erythema. No pallor. Psychiatric: Her mood appears not anxious. Her affect is not angry, not blunt, not labile and not inappropriate. Her speech is not rapid and/or pressured, not delayed, not tangential and not slurred. She is not agitated, not aggressive, not hyperactive, not slowed, not withdrawn, not actively hallucinating and not combative. Thought content is not paranoid and not delusional. Cognition and memory are not impaired. She does not express impulsivity or inappropriate judgment. She does not exhibit a depressed mood. She expresses no homicidal and no suicidal ideation. She expresses no suicidal plans and no homicidal plans. She is communicative. She exhibits normal recent memory and normal remote memory. She is attentive. Nursing note and vitals reviewed. HASEEB test: negative   Yeomans test: negative   Gaenslen test: negative   Assessment:      1.  Cervical radiculitis    2. Cervical stenosis of spinal canal    3. Right arm pain    4. Spondylosis of cervical region without myelopathy or radiculopathy    5.  Chronic pain syndrome          ROS    Physical Exam      Plan:  ISAIAH @ C6-7      Valene Epley, APRN - CNP  10/23/2018

## 2018-11-06 DIAGNOSIS — G89.4 CHRONIC PAIN SYNDROME: ICD-10-CM

## 2018-11-06 DIAGNOSIS — M47.812 SPONDYLOSIS OF CERVICAL REGION WITHOUT MYELOPATHY OR RADICULOPATHY: ICD-10-CM

## 2018-11-06 RX ORDER — GABAPENTIN 300 MG/1
300 CAPSULE ORAL 3 TIMES DAILY
Qty: 120 CAPSULE | Refills: 0 | Status: SHIPPED | OUTPATIENT
Start: 2018-11-06 | End: 2018-12-18 | Stop reason: SDUPTHER

## 2018-11-06 RX ORDER — TRAMADOL HYDROCHLORIDE 50 MG/1
50 TABLET ORAL EVERY 8 HOURS PRN
Qty: 90 TABLET | Refills: 0 | Status: SHIPPED | OUTPATIENT
Start: 2018-11-06 | End: 2018-12-06

## 2018-11-08 ENCOUNTER — HOSPITAL ENCOUNTER (OUTPATIENT)
Age: 53
Setting detail: OUTPATIENT SURGERY
Discharge: HOME | End: 2018-11-08
Attending: PAIN MEDICINE | Admitting: PAIN MEDICINE
Payer: COMMERCIAL

## 2018-11-08 ENCOUNTER — ANESTHESIA (OUTPATIENT)
Dept: OPERATING ROOM | Age: 53
End: 2018-11-08
Payer: COMMERCIAL

## 2018-11-08 ENCOUNTER — APPOINTMENT (OUTPATIENT)
Dept: GENERAL RADIOLOGY | Age: 53
End: 2018-11-08
Attending: PAIN MEDICINE
Payer: COMMERCIAL

## 2018-11-08 ENCOUNTER — ANESTHESIA EVENT (OUTPATIENT)
Dept: OPERATING ROOM | Age: 53
End: 2018-11-08
Payer: COMMERCIAL

## 2018-11-08 VITALS
WEIGHT: 153 LBS | OXYGEN SATURATION: 93 % | BODY MASS INDEX: 25.49 KG/M2 | RESPIRATION RATE: 16 BRPM | DIASTOLIC BLOOD PRESSURE: 59 MMHG | HEART RATE: 84 BPM | HEIGHT: 65 IN | SYSTOLIC BLOOD PRESSURE: 104 MMHG | TEMPERATURE: 97.3 F

## 2018-11-08 VITALS
OXYGEN SATURATION: 97 % | DIASTOLIC BLOOD PRESSURE: 55 MMHG | RESPIRATION RATE: 13 BRPM | SYSTOLIC BLOOD PRESSURE: 100 MMHG

## 2018-11-08 PROCEDURE — 7100000011 HC PHASE II RECOVERY - ADDTL 15 MIN: Performed by: PAIN MEDICINE

## 2018-11-08 PROCEDURE — 62321 NJX INTERLAMINAR CRV/THRC: CPT | Performed by: PAIN MEDICINE

## 2018-11-08 PROCEDURE — 2709999900 HC NON-CHARGEABLE SUPPLY: Performed by: PAIN MEDICINE

## 2018-11-08 PROCEDURE — 2580000003 HC RX 258: Performed by: NURSE ANESTHETIST, CERTIFIED REGISTERED

## 2018-11-08 PROCEDURE — 6360000002 HC RX W HCPCS: Performed by: PAIN MEDICINE

## 2018-11-08 PROCEDURE — 6360000002 HC RX W HCPCS: Performed by: NURSE ANESTHETIST, CERTIFIED REGISTERED

## 2018-11-08 PROCEDURE — 2580000003 HC RX 258: Performed by: PAIN MEDICINE

## 2018-11-08 PROCEDURE — 7100000010 HC PHASE II RECOVERY - FIRST 15 MIN: Performed by: PAIN MEDICINE

## 2018-11-08 PROCEDURE — 2500000003 HC RX 250 WO HCPCS: Performed by: PAIN MEDICINE

## 2018-11-08 PROCEDURE — 6360000004 HC RX CONTRAST MEDICATION: Performed by: PAIN MEDICINE

## 2018-11-08 PROCEDURE — 3600000054 HC PAIN LEVEL 3 BASE: Performed by: PAIN MEDICINE

## 2018-11-08 PROCEDURE — 3209999900 FLUORO FOR SURGICAL PROCEDURES

## 2018-11-08 PROCEDURE — 3700000000 HC ANESTHESIA ATTENDED CARE: Performed by: PAIN MEDICINE

## 2018-11-08 RX ORDER — MIDAZOLAM HYDROCHLORIDE 1 MG/ML
INJECTION INTRAMUSCULAR; INTRAVENOUS PRN
Status: DISCONTINUED | OUTPATIENT
Start: 2018-11-08 | End: 2018-11-08 | Stop reason: SDUPTHER

## 2018-11-08 RX ORDER — FENTANYL CITRATE 50 UG/ML
INJECTION, SOLUTION INTRAMUSCULAR; INTRAVENOUS PRN
Status: DISCONTINUED | OUTPATIENT
Start: 2018-11-08 | End: 2018-11-08 | Stop reason: SDUPTHER

## 2018-11-08 RX ORDER — DEXAMETHASONE SODIUM PHOSPHATE 4 MG/ML
INJECTION, SOLUTION INTRA-ARTICULAR; INTRALESIONAL; INTRAMUSCULAR; INTRAVENOUS; SOFT TISSUE PRN
Status: DISCONTINUED | OUTPATIENT
Start: 2018-11-08 | End: 2018-11-08 | Stop reason: HOSPADM

## 2018-11-08 RX ORDER — LIDOCAINE HYDROCHLORIDE 10 MG/ML
INJECTION, SOLUTION INFILTRATION; PERINEURAL PRN
Status: DISCONTINUED | OUTPATIENT
Start: 2018-11-08 | End: 2018-11-08 | Stop reason: HOSPADM

## 2018-11-08 RX ORDER — 0.9 % SODIUM CHLORIDE 0.9 %
VIAL (ML) INJECTION PRN
Status: DISCONTINUED | OUTPATIENT
Start: 2018-11-08 | End: 2018-11-08 | Stop reason: HOSPADM

## 2018-11-08 RX ORDER — SODIUM CHLORIDE 9 MG/ML
INJECTION, SOLUTION INTRAVENOUS CONTINUOUS PRN
Status: DISCONTINUED | OUTPATIENT
Start: 2018-11-08 | End: 2018-11-08 | Stop reason: SDUPTHER

## 2018-11-08 RX ADMIN — MIDAZOLAM HYDROCHLORIDE 2 MG: 1 INJECTION, SOLUTION INTRAMUSCULAR; INTRAVENOUS at 08:04

## 2018-11-08 RX ADMIN — FENTANYL CITRATE 50 MCG: 50 INJECTION INTRAMUSCULAR; INTRAVENOUS at 08:11

## 2018-11-08 RX ADMIN — FENTANYL CITRATE 50 MCG: 50 INJECTION INTRAMUSCULAR; INTRAVENOUS at 08:05

## 2018-11-08 RX ADMIN — SODIUM CHLORIDE: 9 INJECTION, SOLUTION INTRAVENOUS at 08:04

## 2018-11-08 ASSESSMENT — PULMONARY FUNCTION TESTS
PIF_VALUE: 0
PIF_VALUE: 1
PIF_VALUE: 0

## 2018-11-08 ASSESSMENT — PAIN SCALES - GENERAL
PAINLEVEL_OUTOF10: 7
PAINLEVEL_OUTOF10: 5

## 2018-11-08 ASSESSMENT — PAIN DESCRIPTION - PAIN TYPE: TYPE: CHRONIC PAIN

## 2018-11-08 ASSESSMENT — LIFESTYLE VARIABLES: SMOKING_STATUS: 1

## 2018-11-08 ASSESSMENT — PAIN DESCRIPTION - LOCATION: LOCATION: NECK

## 2018-11-08 ASSESSMENT — PAIN DESCRIPTION - DIRECTION: RADIATING_TOWARDS: BIL ARMS

## 2018-11-08 NOTE — OP NOTE
fluoroscopy the C7 interspace was observed and the skin and deep tissues over the midline were infiltrated with 3  ml of 1% lidocaine. The #20-gauge, 3-1/2 inch Tuohy needle was inserted through the skin wheal and the epidural space was identified using loss of resistance technique with normal saline. This was confirmed with AP and lateral views of the fluoroscopy after injecting about 1 ml of Omnipaque-180 and observing the spread of the contrast in the epidural space. Then after negative aspiration a total of 6 mg of dexamethasone with 3 ml of normal saline were injected into the epidural space. The needle is removed and a Band-Aid was placed over the needle insertion site. Patient's vital signs remained stable and tolerated the procedure well. Patient was discharged home in stable condition and will be followed in the pain clinic in next few weeks for further planning.     Electronically signed by Jeancarlos Arevalo MD on 11/8/2018 at 8:15 AM

## 2018-12-03 ENCOUNTER — OFFICE VISIT (OUTPATIENT)
Dept: PHYSICAL MEDICINE AND REHAB | Age: 53
End: 2018-12-03
Payer: COMMERCIAL

## 2018-12-03 VITALS
BODY MASS INDEX: 25.83 KG/M2 | HEIGHT: 65 IN | DIASTOLIC BLOOD PRESSURE: 68 MMHG | SYSTOLIC BLOOD PRESSURE: 121 MMHG | WEIGHT: 155 LBS | HEART RATE: 90 BPM

## 2018-12-03 DIAGNOSIS — M48.02 CERVICAL STENOSIS OF SPINAL CANAL: ICD-10-CM

## 2018-12-03 DIAGNOSIS — M47.812 SPONDYLOSIS OF CERVICAL REGION WITHOUT MYELOPATHY OR RADICULOPATHY: ICD-10-CM

## 2018-12-03 DIAGNOSIS — M54.12 CERVICAL RADICULOPATHY: Primary | ICD-10-CM

## 2018-12-03 DIAGNOSIS — M54.2 CERVICALGIA: ICD-10-CM

## 2018-12-03 DIAGNOSIS — M62.838 SPASM OF MUSCLE: ICD-10-CM

## 2018-12-03 DIAGNOSIS — G89.4 CHRONIC PAIN SYNDROME: ICD-10-CM

## 2018-12-03 DIAGNOSIS — M54.12 CERVICAL RADICULITIS: ICD-10-CM

## 2018-12-03 PROCEDURE — 99213 OFFICE O/P EST LOW 20 MIN: CPT | Performed by: NURSE PRACTITIONER

## 2018-12-03 ASSESSMENT — ENCOUNTER SYMPTOMS: BACK PAIN: 1

## 2018-12-17 DIAGNOSIS — G89.4 CHRONIC PAIN SYNDROME: Primary | ICD-10-CM

## 2018-12-18 DIAGNOSIS — M47.812 SPONDYLOSIS OF CERVICAL REGION WITHOUT MYELOPATHY OR RADICULOPATHY: ICD-10-CM

## 2018-12-18 DIAGNOSIS — G89.4 CHRONIC PAIN SYNDROME: ICD-10-CM

## 2018-12-18 RX ORDER — TRAMADOL HYDROCHLORIDE 50 MG/1
50 TABLET ORAL EVERY 8 HOURS PRN
Qty: 90 TABLET | Refills: 0 | Status: SHIPPED | OUTPATIENT
Start: 2018-12-18 | End: 2019-01-17

## 2018-12-19 RX ORDER — GABAPENTIN 300 MG/1
300 CAPSULE ORAL 3 TIMES DAILY
Qty: 120 CAPSULE | Refills: 0 | Status: SHIPPED | OUTPATIENT
Start: 2018-12-19 | End: 2019-03-06 | Stop reason: SDUPTHER

## 2019-01-17 ENCOUNTER — PREP FOR PROCEDURE (OUTPATIENT)
Dept: PHYSICAL MEDICINE AND REHAB | Age: 54
End: 2019-01-17

## 2019-01-22 ENCOUNTER — TELEPHONE (OUTPATIENT)
Dept: PHYSICAL MEDICINE AND REHAB | Age: 54
End: 2019-01-22

## 2019-01-23 DIAGNOSIS — G89.4 CHRONIC PAIN SYNDROME: Primary | ICD-10-CM

## 2019-01-23 RX ORDER — TRAMADOL HYDROCHLORIDE 50 MG/1
50 TABLET ORAL EVERY 8 HOURS PRN
Qty: 90 TABLET | Refills: 0 | Status: SHIPPED | OUTPATIENT
Start: 2019-01-23 | End: 2019-03-06 | Stop reason: SDUPTHER

## 2019-02-18 ENCOUNTER — TELEPHONE (OUTPATIENT)
Dept: PHYSICAL MEDICINE AND REHAB | Age: 54
End: 2019-02-18

## 2019-03-06 DIAGNOSIS — M47.812 SPONDYLOSIS OF CERVICAL REGION WITHOUT MYELOPATHY OR RADICULOPATHY: ICD-10-CM

## 2019-03-06 DIAGNOSIS — G89.4 CHRONIC PAIN SYNDROME: ICD-10-CM

## 2019-03-06 RX ORDER — TRAMADOL HYDROCHLORIDE 50 MG/1
50 TABLET ORAL EVERY 8 HOURS PRN
Qty: 90 TABLET | Refills: 0 | Status: SHIPPED | OUTPATIENT
Start: 2019-03-06 | End: 2019-03-07 | Stop reason: SDUPTHER

## 2019-03-06 RX ORDER — GABAPENTIN 300 MG/1
300 CAPSULE ORAL 3 TIMES DAILY
Qty: 120 CAPSULE | Refills: 0 | Status: SHIPPED | OUTPATIENT
Start: 2019-03-06 | End: 2019-04-18 | Stop reason: SDUPTHER

## 2019-03-07 DIAGNOSIS — G89.4 CHRONIC PAIN SYNDROME: ICD-10-CM

## 2019-03-07 RX ORDER — TRAMADOL HYDROCHLORIDE 50 MG/1
50 TABLET ORAL EVERY 8 HOURS PRN
Qty: 90 TABLET | Refills: 0 | Status: SHIPPED | OUTPATIENT
Start: 2019-03-07 | End: 2019-04-11 | Stop reason: SDUPTHER

## 2019-03-26 ENCOUNTER — PREP FOR PROCEDURE (OUTPATIENT)
Dept: PHYSICAL MEDICINE AND REHAB | Age: 54
End: 2019-03-26

## 2019-03-26 NOTE — H&P (VIEW-ONLY)
Claire Strickland is a 46 y. o. female is here today for     Chief Complaint: Neck pain           The patientis allergic to strawberry (diagnostic).     Past Medical History  Page  has a past medical history of Herniated disc, cervical.     Past Surgical History  The patient  has a past surgical history that includes Tubal ligation; Appendectomy; Rotator cuff repair (Left); Tonsillectomy; other surgical history (Bilateral, 03/06/2017); other surgical history (Bilateral, 05/02/2017); Nerve Surgery (Left, 08/01/2017); pr dest,paravertebral,c/t,single (Left, 8/1/2017); other surgical history (Right, 11/07/2017); pr dest,paravertebral,c/t,single (Right, 11/7/2017); other surgical history (Left, 05/03/2018); pr dstr nrolytc agnt parverteb fct sngl crvcl/thora (Left, 5/3/2018); pr dstr nrolytc agnt parverteb fct sngl crvcl/thora (Right, 7/12/2018); and pr office/outpt visit,procedure only (N/A, 11/8/2018).    Family History  This patient's family history includes Cancer in her father and another family member; Dementia in her mother; Diabetes in her mother; Stroke in her mother.     Social History  Page  reports that she has been smoking Cigarettes.  She has a 30.00 pack-year smoking history. She has never used smokeless tobacco. She reports that she does not drink alcohol or use drugs.     Medications     Current Medication      Current Outpatient Prescriptions:     traMADol (ULTRAM) 50 MG tablet, Take 1 tablet by mouth every 8 hours as needed for Pain for up to 30 days. ., Disp: 90 tablet, Rfl: 0    gabapentin (NEURONTIN) 300 MG capsule, Take 1 capsule by mouth 3 times daily for 30 days. Take 1 capsule in AM (300mg), 1 capsule in afternoon(300mg), and 2 capsules in PM(600mg). , Disp: 120 capsule, Rfl: 0    ondansetron (ZOFRAN) 4 MG tablet, Take 4 mg by mouth every 8 hours as needed for Nausea or Vomiting, Disp: , Rfl:     cyclobenzaprine (FLEXERIL) 5 MG tablet, Take 5 mg by mouth 2 times daily as needed , Disp: , Rfl:       Subjective:      Review of Systems   Constitutional: Positive for activity change. Musculoskeletal: Positive for arthralgias, back pain, gait problem, neck pain and neck stiffness. Neurological: Positive for numbness. Negative for seizures. Psychiatric/Behavioral: Positive for sleep disturbance. The patient is nervous/anxious.          Objective:      Vitals                                           Weight: 155 lb (70.3 kg)   Height: 5' 5\" (1.651 m)            Physical Exam   Constitutional: She is oriented to person, place, and time. She appears well-developed and well-nourished. No distress. HENT:   Head: Normocephalic. Right Ear: External ear normal.   Left Ear: External ear normal.   Eyes: Conjunctivae are normal. Right eye exhibits no discharge. Left eye exhibits no discharge. Neck: Muscular tenderness present. No tracheal deviation present. Pulmonary/Chest: Effort normal. No respiratory distress. Musculoskeletal: She exhibits tenderness. She exhibits no edema.        Cervical back: She exhibits decreased range of motion, tenderness, pain and spasm.        Lumbar back: She exhibits decreased range of motion, tenderness and pain.        Back:         Hands:       Legs:  Neurological: She is alert and oriented to person, place, and time. No cranial nerve deficit. Skin: Skin is warm and dry. She is not diaphoretic. No erythema. No pallor. Psychiatric: She has a normal mood and affect. Her speech is normal and behavior is normal. Judgment and thought content normal. She is not actively hallucinating. She is attentive.         Assessment:      1. Cervical radiculopathy    2. Cervicalgia    3. Chronic pain syndrome    4. Spasm of muscle    5. Cervical stenosis of spinal canal    6. Spondylosis of cervical region without myelopathy or radiculopathy    7.  Cervical radiculitis            Plan:  ISAIAH C7 RIGHT #2    Electronically signed by TERELL Tirado CNP on 3/26/2019 at 12:17 PM                      José Luis Glasgow, TERELL - CNP  3/26/2019

## 2019-04-04 ENCOUNTER — APPOINTMENT (OUTPATIENT)
Dept: GENERAL RADIOLOGY | Age: 54
End: 2019-04-04
Attending: PAIN MEDICINE
Payer: COMMERCIAL

## 2019-04-04 ENCOUNTER — HOSPITAL ENCOUNTER (OUTPATIENT)
Age: 54
Setting detail: OUTPATIENT SURGERY
Discharge: HOME | End: 2019-04-04
Attending: PAIN MEDICINE | Admitting: PAIN MEDICINE
Payer: COMMERCIAL

## 2019-04-04 ENCOUNTER — ANESTHESIA EVENT (OUTPATIENT)
Dept: OPERATING ROOM | Age: 54
End: 2019-04-04
Payer: COMMERCIAL

## 2019-04-04 ENCOUNTER — ANESTHESIA (OUTPATIENT)
Dept: OPERATING ROOM | Age: 54
End: 2019-04-04
Payer: COMMERCIAL

## 2019-04-04 VITALS
HEIGHT: 65 IN | BODY MASS INDEX: 23.89 KG/M2 | DIASTOLIC BLOOD PRESSURE: 55 MMHG | HEART RATE: 84 BPM | TEMPERATURE: 98.2 F | RESPIRATION RATE: 17 BRPM | WEIGHT: 143.4 LBS | SYSTOLIC BLOOD PRESSURE: 105 MMHG | OXYGEN SATURATION: 96 %

## 2019-04-04 VITALS
DIASTOLIC BLOOD PRESSURE: 56 MMHG | RESPIRATION RATE: 13 BRPM | SYSTOLIC BLOOD PRESSURE: 105 MMHG | OXYGEN SATURATION: 96 %

## 2019-04-04 PROCEDURE — 6360000004 HC RX CONTRAST MEDICATION: Performed by: PAIN MEDICINE

## 2019-04-04 PROCEDURE — 7100000011 HC PHASE II RECOVERY - ADDTL 15 MIN: Performed by: PAIN MEDICINE

## 2019-04-04 PROCEDURE — 2500000003 HC RX 250 WO HCPCS: Performed by: PAIN MEDICINE

## 2019-04-04 PROCEDURE — 2580000003 HC RX 258: Performed by: PAIN MEDICINE

## 2019-04-04 PROCEDURE — 62321 NJX INTERLAMINAR CRV/THRC: CPT | Performed by: PAIN MEDICINE

## 2019-04-04 PROCEDURE — 6360000002 HC RX W HCPCS: Performed by: PAIN MEDICINE

## 2019-04-04 PROCEDURE — 7100000010 HC PHASE II RECOVERY - FIRST 15 MIN: Performed by: PAIN MEDICINE

## 2019-04-04 PROCEDURE — 2709999900 HC NON-CHARGEABLE SUPPLY: Performed by: PAIN MEDICINE

## 2019-04-04 PROCEDURE — 6360000002 HC RX W HCPCS: Performed by: NURSE ANESTHETIST, CERTIFIED REGISTERED

## 2019-04-04 PROCEDURE — 3600000054 HC PAIN LEVEL 3 BASE: Performed by: PAIN MEDICINE

## 2019-04-04 PROCEDURE — 3700000000 HC ANESTHESIA ATTENDED CARE: Performed by: PAIN MEDICINE

## 2019-04-04 PROCEDURE — 3209999900 FLUORO FOR SURGICAL PROCEDURES

## 2019-04-04 RX ORDER — FENTANYL CITRATE 50 UG/ML
INJECTION, SOLUTION INTRAMUSCULAR; INTRAVENOUS PRN
Status: DISCONTINUED | OUTPATIENT
Start: 2019-04-04 | End: 2019-04-04 | Stop reason: SDUPTHER

## 2019-04-04 RX ORDER — DEXAMETHASONE SODIUM PHOSPHATE 4 MG/ML
INJECTION, SOLUTION INTRA-ARTICULAR; INTRALESIONAL; INTRAMUSCULAR; INTRAVENOUS; SOFT TISSUE PRN
Status: DISCONTINUED | OUTPATIENT
Start: 2019-04-04 | End: 2019-04-04 | Stop reason: ALTCHOICE

## 2019-04-04 RX ORDER — 0.9 % SODIUM CHLORIDE 0.9 %
VIAL (ML) INJECTION PRN
Status: DISCONTINUED | OUTPATIENT
Start: 2019-04-04 | End: 2019-04-04 | Stop reason: ALTCHOICE

## 2019-04-04 RX ORDER — LIDOCAINE HYDROCHLORIDE 10 MG/ML
INJECTION, SOLUTION INFILTRATION; PERINEURAL PRN
Status: DISCONTINUED | OUTPATIENT
Start: 2019-04-04 | End: 2019-04-04 | Stop reason: ALTCHOICE

## 2019-04-04 RX ADMIN — FENTANYL CITRATE 100 MCG: 50 INJECTION INTRAMUSCULAR; INTRAVENOUS at 08:18

## 2019-04-04 ASSESSMENT — PAIN - FUNCTIONAL ASSESSMENT: PAIN_FUNCTIONAL_ASSESSMENT: 0-10

## 2019-04-04 ASSESSMENT — PULMONARY FUNCTION TESTS
PIF_VALUE: 0
PIF_VALUE: 1
PIF_VALUE: 0

## 2019-04-04 ASSESSMENT — PAIN SCALES - GENERAL: PAINLEVEL_OUTOF10: 6

## 2019-04-04 NOTE — ANESTHESIA PRE PROCEDURE
Right 11/07/2017    Cervical Facet MBB RFA C4-5, C5-6, C6-7     OTHER SURGICAL HISTORY Left 05/03/2018    Cervical RFA at C4-5, C5-6, C6-7    [facility],PARAVERTEBRAL,C/T,SINGLE Left 8/1/2017    CERVICAL RFA C4-5, C5-6, C6-7, LEFT performed by Fide Licea MD at [address] [place] [address] Right 11/7/2017    RIGHT SIDE C-FACET RFA @ C4-5, C5-6, C6-7 performed by Fide Licea MD at [address] [facility] CRVCL/THORA Left 5/3/2018    C-RFA  @ C4-5,5-6,6-7. LEFT SIDE FIRST.  performed by Fide Licea MD at [place] [address] [address] [facility] CRVCL/THORA Right 7/12/2018    C-RFA RIGHT SIDE @ C4-5,5-6,6-7 performed by Fide Licea MD at [address] [place] [address] OFFICE/OUTPT VISIT,PROCEDURE ONLY N/A 11/8/2018    CERVICAL INTERLAMINAR ALICE ISAIAH @ C7 right performed by Fide Licea MD at [place] [address] Left     TONSILLECTOMY      TUBAL LIGATION         Social History:    Social History     Tobacco Use    Smoking status: Current Every Day Smoker     Packs/day: 1.00     Years: 30.00     Pack years: 30.00     Types: Cigarettes    Smokeless tobacco: Never Used   Substance Use Topics    Alcohol use: No     Comment: rarely                                Ready to quit: Not Answered  Counseling given: Not Answered      Vital Signs (Current):   Vitals:    04/04/19 0719   BP: (!) 129/58   Pulse: 83   Resp: 16   Temp: 97.9 °F (36.6 °C)   TempSrc: Temporal   SpO2: 100%   Weight: 143 lb 6.4 oz (65 kg)   Height: 5' 5\" (1.651 m)                                              BP Readings from Last 3 Encounters:   04/04/19 (!) 129/58   12/03/18 121/68   11/08/18 (!) 104/59       NPO Status: Time of last liquid consumption: 1930                        Time of last solid consumption: 1930                        Date of last liquid consumption: 04/03/19 Date of last solid food consumption: 04/03/19    BMI:   Wt Readings from Last 3 Encounters:   04/04/19 143 lb 6.4 oz (65 kg)   12/03/18 155 lb (70.3 kg)   11/08/18 153 lb (69.4 kg)     Body mass index is 23.86 kg/m². CBC: No results found for: WBC, RBC, HGB, HCT, MCV, RDW, PLT    CMP: No results found for: NA, K, CL, CO2, BUN, CREATININE, GFRAA, AGRATIO, LABGLOM, GLUCOSE, PROT, CALCIUM, BILITOT, ALKPHOS, AST, ALT    POC Tests: No results for input(s): POCGLU, POCNA, POCK, POCCL, POCBUN, POCHEMO, POCHCT in the last 72 hours. Coags: No results found for: PROTIME, INR, APTT    HCG (If Applicable): No results found for: PREGTESTUR, PREGSERUM, HCG, HCGQUANT     ABGs: No results found for: PHART, PO2ART, ROI1TCJ, CCG3NLS, BEART, A3QUMPCS     Type & Screen (If Applicable):  No results found for: LABABO, LABRH    Anesthesia Evaluation    Airway: Mallampati: II       Mouth opening: > = 3 FB Dental:          Pulmonary:       (-) COPD                           Cardiovascular:        (-) past MI and CAD      Rhythm: regular                      Neuro/Psych:   (+) neuromuscular disease:,             GI/Hepatic/Renal:             Endo/Other:                     Abdominal:           Vascular:                                        Anesthesia Plan      MAC     ASA 2             Anesthetic plan and risks discussed with patient. Plan discussed with CRNA.                   Alina Mari MD   4/4/2019

## 2019-04-04 NOTE — PROGRESS NOTES
1506-  Patient arrived to phase II via cart. Spontaneous respirations even and unlabored. Placed on monitor--VSS. Report received from Surgical RN.   1300-  Assessment completed. Patient is alert and oriented x4. IV capped off- no complications. Patient states pain tolerable--will monitor. Injection sites clean and dry. 1799-  Snack and drink given to patient. Family brought to room. 3158-  Belongings brought to patient. 0845-  IV removed from left FA-- no complications. Bandage applied. 1693-  Discharge instructions given. Understanding verbalized. 5068-  Patient discharged in stable condition with all belongings. This RN walked patient to car.

## 2019-04-10 DIAGNOSIS — G89.4 CHRONIC PAIN SYNDROME: ICD-10-CM

## 2019-04-10 NOTE — TELEPHONE ENCOUNTER
Gia Moore called requesting a refill on the following medications:  Requested Prescriptions     Pending Prescriptions Disp Refills    traMADol (ULTRAM) 50 MG tablet 90 tablet 0     Sig: Take 1 tablet by mouth every 8 hours as needed for Pain for up to 30 days.      Pharmacy verified: [facility] [place], [place]      Date of last visit: 12/3/18  Date of next visit (if applicable): 7/54/9165

## 2019-04-11 RX ORDER — TRAMADOL HYDROCHLORIDE 50 MG/1
50 TABLET ORAL EVERY 8 HOURS PRN
Qty: 90 TABLET | Refills: 0 | Status: SHIPPED | OUTPATIENT
Start: 2019-04-11 | End: 2019-05-11

## 2019-04-18 ENCOUNTER — OFFICE VISIT (OUTPATIENT)
Dept: PHYSICAL MEDICINE AND REHAB | Age: 54
End: 2019-04-18
Payer: COMMERCIAL

## 2019-04-18 VITALS
HEART RATE: 80 BPM | WEIGHT: 143.3 LBS | HEIGHT: 65 IN | DIASTOLIC BLOOD PRESSURE: 63 MMHG | SYSTOLIC BLOOD PRESSURE: 95 MMHG | BODY MASS INDEX: 23.88 KG/M2

## 2019-04-18 DIAGNOSIS — M79.18 ACUTE MYOFASCIAL PAIN: ICD-10-CM

## 2019-04-18 DIAGNOSIS — M48.02 CERVICAL STENOSIS OF SPINAL CANAL: ICD-10-CM

## 2019-04-18 DIAGNOSIS — M79.601 RIGHT ARM PAIN: ICD-10-CM

## 2019-04-18 DIAGNOSIS — M47.812 SPONDYLOSIS OF CERVICAL REGION WITHOUT MYELOPATHY OR RADICULOPATHY: ICD-10-CM

## 2019-04-18 DIAGNOSIS — M54.12 CERVICAL RADICULOPATHY: Primary | ICD-10-CM

## 2019-04-18 DIAGNOSIS — M54.12 CERVICAL RADICULITIS: ICD-10-CM

## 2019-04-18 DIAGNOSIS — G89.4 CHRONIC PAIN SYNDROME: ICD-10-CM

## 2019-04-18 PROCEDURE — G8420 CALC BMI NORM PARAMETERS: HCPCS | Performed by: NURSE PRACTITIONER

## 2019-04-18 PROCEDURE — 4004F PT TOBACCO SCREEN RCVD TLK: CPT | Performed by: NURSE PRACTITIONER

## 2019-04-18 PROCEDURE — G8427 DOCREV CUR MEDS BY ELIG CLIN: HCPCS | Performed by: NURSE PRACTITIONER

## 2019-04-18 PROCEDURE — 3017F COLORECTAL CA SCREEN DOC REV: CPT | Performed by: NURSE PRACTITIONER

## 2019-04-18 PROCEDURE — 99213 OFFICE O/P EST LOW 20 MIN: CPT | Performed by: NURSE PRACTITIONER

## 2019-04-18 RX ORDER — GABAPENTIN 300 MG/1
300 CAPSULE ORAL 3 TIMES DAILY
Qty: 120 CAPSULE | Refills: 0 | Status: SHIPPED | OUTPATIENT
Start: 2019-04-18 | End: 2019-05-22 | Stop reason: SDUPTHER

## 2019-04-18 ASSESSMENT — ENCOUNTER SYMPTOMS: BACK PAIN: 1

## 2019-04-18 NOTE — PROGRESS NOTES
[address] [facility]  [address] [facility] [address].  Dept: [phone]  Dept Fax: [phone]: [phone]    Visit Date: 4/18/2019    Functionality Assessment/Goals Worksheet     On a scale of 0 (Does not Interfere) to 10 (Completely Interferes)     1. Which number describes how during the past week pain has interfered with       the following:  A. General Activity:  7  B. Mood: 7  C. Walking Ability:  5  D. Normal Work (Includes both work outside the home and housework):  7  E. Relations with Other People:   5  F. Sleep:   8  G. Enjoyment of Life:   8    2. Patient Prefers to Take their Pain Medications:     [x]  On a regular basis   []  Only when necessary    []  Does not take pain medications    3. What are the Patient's Goals/Expectations for Visiting Pain Management? [x]  Learn about my pain    []  Receive Medication   []  Physical Therapy     []  Treat Depression   []  Receive Injections    []  Treat Sleep   []  Deal with Anxiety and Stress   []  Treat Opoid Dependence/Addiction   []  Other:      HPI:   Kailee Pratt is a 46 y.o. female is here today for    Chief Complaint: Neck pain, generalized pain     HPI   FU ISAIAH # 2 from 4/4/2019. Reports no relief at all from ISAIAH # 2. Continues to have neck pain radiating across shoulder blades. Also reports pain through out back and generalized pain all over. Pain is described as dull aching pain   Feels that physical therapy increased pain all over       Increase in Neurontin is helping   Feels that Ultram is not helping at all anymore   Medications reviewed. Patient denies side effects with medications. Patient states she is taking medications as prescribed. Shedenies receiving pain medications from other sources. She denies any ER visits since last visit. Pain scale with out pain medications or at its worst is 10/10.   Pain scale with pain tablet, Rfl: 0    ondansetron (ZOFRAN) 4 MG tablet, Take 4 mg by mouth every 8 hours as needed for Nausea or Vomiting, Disp: , Rfl:     cyclobenzaprine (FLEXERIL) 5 MG tablet, Take 5 mg by mouth 2 times daily as needed , Disp: , Rfl:     Subjective:      Review of Systems   Musculoskeletal: Positive for arthralgias, back pain, joint swelling, myalgias, neck pain and neck stiffness. Neurological: Positive for weakness, numbness and headaches. Objective:     Vitals:    04/18/19 1226   BP: 95/63   Site: Right Upper Arm   Position: Sitting   Cuff Size: Medium Adult   Pulse: 80   Weight: 143 lb 4.8 oz (65 kg)   Height: 5' 5\" (1.651 m)       Physical Exam   Constitutional: She is oriented to person, place, and time. She appears well-developed and well-nourished. No distress. HENT:   Head: Normocephalic and atraumatic. Right Ear: External ear normal.   Left Ear: External ear normal.   Nose: Nose normal.   Mouth/Throat: Oropharynx is clear and moist. No oropharyngeal exudate. Eyes: Pupils are equal, round, and reactive to light. Conjunctivae and EOM are normal. Right eye exhibits no discharge. Left eye exhibits no discharge. No scleral icterus. Neck: Normal range of motion and full passive range of motion without pain. Neck supple. No muscular tenderness present. No neck rigidity. No edema, no erythema and normal range of motion present. No thyromegaly present. Cardiovascular: Normal rate, regular rhythm, normal heart sounds and intact distal pulses. Exam reveals no gallop and no friction rub. No murmur heard. Pulmonary/Chest: Effort normal and breath sounds normal. No respiratory distress. She has no wheezes. She has no rales. She exhibits no tenderness. Abdominal: Soft. Bowel sounds are normal. She exhibits no distension. There is no tenderness. There is no rebound and no guarding. Musculoskeletal: She exhibits tenderness.         Right shoulder: Normal.        Left shoulder: Normal.        Right elbow: Normal.       Left elbow: Normal.        Right wrist: Normal.        Left wrist: Normal.        Right hip: Normal.        Left hip: Normal.        Right knee: Normal.        Left knee: Normal.        Right ankle: Normal.        Left ankle: Normal.        Cervical back: She exhibits tenderness, pain and spasm. Thoracic back: She exhibits tenderness, pain and spasm. Lumbar back: She exhibits tenderness. Back:         Right upper leg: Normal.        Left upper leg: Normal.        Right lower leg: Normal.        Left lower leg: Normal.        Right foot: Normal.        Left foot: Normal.   Neurological: She is alert and oriented to person, place, and time. She has normal strength. She is not disoriented. She displays no atrophy. A sensory deficit is present. No cranial nerve deficit. She exhibits normal muscle tone. She displays a negative Romberg sign. Coordination and gait normal. She displays no Babinski's sign on the right side. Reflex Scores:       Tricep reflexes are 1+ on the right side and 2+ on the left side. Bicep reflexes are 1+ on the right side and 2+ on the left side. Brachioradialis reflexes are 1+ on the right side and 2+ on the left side. Patellar reflexes are 2+ on the right side and 2+ on the left side. Achilles reflexes are 2+ on the right side and 2+ on the left side. SLR-  Motor 5/5 lower extremities, 4/5 strength bilateral upper extremities   numbness in right hand      Skin: Skin is warm. No rash noted. She is not diaphoretic. No erythema. No pallor. Psychiatric: Her mood appears not anxious. Her affect is not angry, not blunt, not labile and not inappropriate. Her speech is not rapid and/or pressured, not delayed, not tangential and not slurred. She is not agitated, not aggressive, not hyperactive, not slowed, not withdrawn, not actively hallucinating and not combative.  Thought content is not paranoid and not delusional. Cognition and for 30 days. Take 1 capsule in AM (300mg), 1 capsule in afternoon(300mg), and 2 capsules in PM(600mg)     Dispense:  120 capsule     Refill:  0     Fill on or after 11/7/2018       Return for  Cervical trigger point injections in office with Salma and possibly throughout back.  .         Electronically signed by TERELL Braxton CNP on4/18/2019 at 12:43 PM

## 2019-05-14 ENCOUNTER — OFFICE VISIT (OUTPATIENT)
Dept: PHYSICAL MEDICINE AND REHAB | Age: 54
End: 2019-05-14
Payer: COMMERCIAL

## 2019-05-14 VITALS
HEART RATE: 94 BPM | SYSTOLIC BLOOD PRESSURE: 118 MMHG | DIASTOLIC BLOOD PRESSURE: 66 MMHG | WEIGHT: 143 LBS | HEIGHT: 65 IN | BODY MASS INDEX: 23.82 KG/M2

## 2019-05-14 DIAGNOSIS — M79.18 MYOFASCIAL MUSCLE PAIN: Primary | ICD-10-CM

## 2019-05-14 DIAGNOSIS — G89.4 CHRONIC PAIN SYNDROME: Primary | ICD-10-CM

## 2019-05-14 PROCEDURE — 20553 NJX 1/MLT TRIGGER POINTS 3/>: CPT | Performed by: NURSE PRACTITIONER

## 2019-05-14 PROCEDURE — 99999 PR OFFICE/OUTPT VISIT,PROCEDURE ONLY: CPT | Performed by: NURSE PRACTITIONER

## 2019-05-14 RX ORDER — TRAMADOL HYDROCHLORIDE 50 MG/1
50 TABLET ORAL EVERY 8 HOURS PRN
Qty: 90 TABLET | Refills: 0 | Status: SHIPPED | OUTPATIENT
Start: 2019-05-14 | End: 2019-06-13

## 2019-05-14 RX ORDER — TRAMADOL HYDROCHLORIDE 50 MG/1
50 TABLET ORAL EVERY 8 HOURS PRN
COMMUNITY
End: 2019-06-17 | Stop reason: SDUPTHER

## 2019-05-14 RX ORDER — METHYLPREDNISOLONE ACETATE 40 MG/ML
40 INJECTION, SUSPENSION INTRA-ARTICULAR; INTRALESIONAL; INTRAMUSCULAR; SOFT TISSUE ONCE
Status: COMPLETED | OUTPATIENT
Start: 2019-05-14 | End: 2019-05-14

## 2019-05-14 RX ADMIN — METHYLPREDNISOLONE ACETATE 40 MG: 40 INJECTION, SUSPENSION INTRA-ARTICULAR; INTRALESIONAL; INTRAMUSCULAR; SOFT TISSUE at 14:34

## 2019-05-14 NOTE — PROGRESS NOTES
Procedure  Visit Date: 5/14/2019    Tylor Deluna is a 46 y.o. female presents today in the office for the following:  Chief Complaint   Patient presents with    Follow-up     Cervical TPI and possibly throughout back       History of Present Illness   Ariana House is here today for cervical and right low back trigger point injections. Pre-Op Diagnosis   Myofacial pain syndrome     Post-Op Diagnosis   Myofacial pain syndrome     Procedure Documentation   Patient identified. Consent signed. Site identified. A solution of 9cc 0.25% marcaine and 40mg (1cc) DepoMedrol was combined in each syringe. Site was sprayed with Ethyl chloride and then prepped with alcohol swap X 3. Then with a 25g needle entered each trigger point and after negative aspiration, injected 1-2 cc of Marcaine/DepoMedrol solution at each site. A total of 20 cc was used for procedure. Total of 10 sites were injected into 4 muscles. Vitals:    05/14/19 1430   BP: 118/66   Site: Right Upper Arm   Position: Sitting   Cuff Size: Medium Adult   Pulse: 94   Weight: 143 lb (64.9 kg)   Height: 5' 5\" (1.651 m)       Conclusion   No complications encountered. Patient discharged stable condition. Patient told if any problems to call office or go to ER.     Orders Placed This Encounter   Medications    methylPREDNISolone acetate (DEPO-MEDROL) injection 40 mg       Electronically signed by TERELL Adler CNP on 5/14/2019 at 2:51 PM

## 2019-05-14 NOTE — PROGRESS NOTES
[address] [facility] [place]  [address]  Dept: [phone]  Dept Fax: [phone]  Loc: [phone]    Visit Date: 5/14/2019    Functionality Assessment/Goals Worksheet     On a scale of 0 (Does not Interfere) to 10 (Completely Interferes)     1. Which number describes how during the past week pain has interfered with       the following:  A. General Activity:  8  B. Mood: 7  C. Walking Ability:  5  D. Normal Work (Includes both work outside the home and housework):  8  E. Relations with Other People:   5  F. Sleep:   8  G. Enjoyment of Life:   7    2. Patient Prefers to Take their Pain Medications:     [x]  On a regular basis   []  Only when necessary    []  Does not take pain medications    3. What are the Patient's Goals/Expectations for Visiting Pain Management?      [x]  Learn about my pain    []  Receive Medication   []  Physical Therapy     []  Treat Depression   []  Receive Injections    []  Treat Sleep   []  Deal with Anxiety and Stress   []  Treat Opoid Dependence/Addiction   []  Other:

## 2019-05-14 NOTE — PATIENT INSTRUCTIONS
Patient Education        Learning About Benefits From Quitting Smoking  How does quitting smoking make you healthier? If you're thinking about quitting smoking, you may have a few reasons to be smoke-free. Your health may be one of them. · When you quit smoking, you lower your risks for cancer, lung disease, heart attack, stroke, blood vessel disease, and blindness from macular degeneration. · When you're smoke-free, you get sick less often, and you heal faster. You are less likely to get colds, flu, bronchitis, and pneumonia. · As a nonsmoker, you may find that your mood is better and you are less stressed. When and how will you feel healthier? Quitting has real health benefits that start from day 1 of being smoke-free. And the longer you stay smoke-free, the healthier you get and the better you feel. The first hours  · After just 20 minutes, your blood pressure and heart rate go down. That means there's less stress on your heart and blood vessels. · Within 12 hours, the level of carbon monoxide in your blood drops back to normal. That makes room for more oxygen. With more oxygen in your body, you may notice that you have more energy than when you smoked. After 2 weeks  · Your lungs start to work better. · Your risk of heart attack starts to drop. After 1 month  · When your lungs are clear, you cough less and breathe deeper, so it's easier to be active. · Your sense of taste and smell return. That means you can enjoy food more than you have since you started smoking. Over the years  · After 1 year, your risk of heart disease is half what it would be if you kept smoking. · After 5 years, your risk of stroke starts to shrink. Within a few years after that, it's about the same as if you'd never smoked. · After 10 years, your risk of dying from lung cancer is cut by about half. And your risk for many other types of cancer is lower too. How would quitting help others in your life?   When you quit smoking, you improve the health of everyone who now breathes in your smoke. · Their heart, lung, and cancer risks drop, much like yours. · They are sick less. For babies and small children, living smoke-free means they're less likely to have ear infections, pneumonia, and bronchitis. · If you're a woman who is or will be pregnant someday, quitting smoking means a healthier . · Children who are close to you are less likely to become adult smokers. Where can you learn more? Go to https://[email or web address]. org and sign in to your [employer] account. Enter 003 806 72 11 in the [facility] box to learn more about \"Learning About Benefits From Quitting Smoking. \"     If you do not have an account, please click on the \"Sign Up Now\" link. Current as of: 2018  Content Version: 12.0  © 7018-4644 Healthwise, Incorporated. Care instructions adapted under license by [facility] ([facility]). If you have questions about a medical condition or this instruction, always ask your healthcare professional. [address] any warranty or liability for your use of this information.

## 2019-05-22 DIAGNOSIS — G89.4 CHRONIC PAIN SYNDROME: ICD-10-CM

## 2019-05-22 DIAGNOSIS — M47.812 SPONDYLOSIS OF CERVICAL REGION WITHOUT MYELOPATHY OR RADICULOPATHY: ICD-10-CM

## 2019-05-22 RX ORDER — GABAPENTIN 300 MG/1
300 CAPSULE ORAL 3 TIMES DAILY
Qty: 120 CAPSULE | Refills: 1 | Status: SHIPPED | OUTPATIENT
Start: 2019-05-22 | End: 2019-07-15 | Stop reason: SDUPTHER

## 2019-05-22 NOTE — TELEPHONE ENCOUNTER
OARRS reviewed. UDS: + for Gabapentin,Tramadol and O-Desmethyltramadol. Narcan offered: Yes    Last seen: 4/18/2019.  Follow-up:   Future Appointments   Date Time Provider Rhys Garner   6/20/2019  7:50 AM TERELL Ayala - CNP [facility] - [address]

## 2019-06-17 DIAGNOSIS — G89.4 CHRONIC PAIN SYNDROME: Primary | ICD-10-CM

## 2019-06-17 NOTE — TELEPHONE ENCOUNTER
Harmony Mazariegos called requesting a refill on the following medications:  Requested Prescriptions     Pending Prescriptions Disp Refills    traMADol (ULTRAM) 50 MG tablet       Sig: Take 1 tablet by mouth every 8 hours as needed for Pain.      Pharmacy verified: [facility]      Date of last visit: 5/14/19  Date of next visit (if applicable): 1/98/4541

## 2019-06-18 RX ORDER — TRAMADOL HYDROCHLORIDE 50 MG/1
50 TABLET ORAL EVERY 8 HOURS PRN
Qty: 90 TABLET | Refills: 0 | Status: SHIPPED | OUTPATIENT
Start: 2019-06-18 | End: 2019-07-15 | Stop reason: SDUPTHER

## 2019-06-20 ENCOUNTER — OFFICE VISIT (OUTPATIENT)
Dept: PHYSICAL MEDICINE AND REHAB | Age: 54
End: 2019-06-20
Payer: COMMERCIAL

## 2019-06-20 VITALS
DIASTOLIC BLOOD PRESSURE: 61 MMHG | HEIGHT: 65 IN | WEIGHT: 143.08 LBS | SYSTOLIC BLOOD PRESSURE: 102 MMHG | HEART RATE: 90 BPM | BODY MASS INDEX: 23.84 KG/M2

## 2019-06-20 DIAGNOSIS — M54.12 CERVICAL RADICULOPATHY: ICD-10-CM

## 2019-06-20 DIAGNOSIS — M79.18 MYOFASCIAL MUSCLE PAIN: ICD-10-CM

## 2019-06-20 DIAGNOSIS — G89.4 CHRONIC PAIN SYNDROME: ICD-10-CM

## 2019-06-20 DIAGNOSIS — M48.02 CERVICAL STENOSIS OF SPINAL CANAL: ICD-10-CM

## 2019-06-20 DIAGNOSIS — M47.812 SPONDYLOSIS OF CERVICAL REGION WITHOUT MYELOPATHY OR RADICULOPATHY: Primary | ICD-10-CM

## 2019-06-20 PROCEDURE — 3017F COLORECTAL CA SCREEN DOC REV: CPT | Performed by: NURSE PRACTITIONER

## 2019-06-20 PROCEDURE — G8427 DOCREV CUR MEDS BY ELIG CLIN: HCPCS | Performed by: NURSE PRACTITIONER

## 2019-06-20 PROCEDURE — G8420 CALC BMI NORM PARAMETERS: HCPCS | Performed by: NURSE PRACTITIONER

## 2019-06-20 PROCEDURE — 4004F PT TOBACCO SCREEN RCVD TLK: CPT | Performed by: NURSE PRACTITIONER

## 2019-06-20 PROCEDURE — 99213 OFFICE O/P EST LOW 20 MIN: CPT | Performed by: NURSE PRACTITIONER

## 2019-06-20 ASSESSMENT — ENCOUNTER SYMPTOMS
VOICE CHANGE: 1
BACK PAIN: 1

## 2019-06-20 NOTE — PROGRESS NOTES
135 [facility]  [address] [facility] [address].  Dept: [phone]  Dept Fax: [phone]: [phone]    Visit Date: 6/20/2019    Functionality Assessment/Goals Worksheet     On a scale of 0 (Does not Interfere) to 10 (Completely Interferes)     1. Which number describes how during the past week pain has interfered with       the following:  A. General Activity:  7  B. Mood: 5  C. Walking Ability:  7  D. Normal Work (Includes both work outside the home and housework):  7  E. Relations with Other People:   5  F. Sleep:   8  G. Enjoyment of Life:   7    2. Patient Prefers to Take their Pain Medications:     [x]  On a regular basis   []  Only when necessary    []  Does not take pain medications    3. What are the Patient's Goals/Expectations for Visiting Pain Management? []  Learn about my pain    []  Receive Medication   []  Physical Therapy     []  Treat Depression   []  Receive Injections    []  Treat Sleep   []  Deal with Anxiety and Stress   []  Treat Opoid Dependence/Addiction   []  Other:      HPI:   Frnak Middleton is a 46 y.o. female is here today for    Chief Complaint: Neck pain, generalized     HPI   2 month FU. Continues to have posterior neck pain radiating to shoulders- dull pain. Continues to have generalized joint pain. States no relief from trigger point injections, had increased pain for 3 days. Physical therapy increased pain. Medications reviewed. Patient has nauseaside effects with medications. Patient states she is taking medications as prescribed. Shedenies receiving pain medications from other sources. She denies any ER visits since last visit. Pain scale with out pain medications or at its worst is 8/10. Pain scale with pain medications or at its best is 6/10.   Last dose of Ultram and Neurontin  was today  Drug screen reviewed from 4/18/2019 and was appropriate  Pill count was completed today and was appropriate  Patient does not have naloxone available at home. Patient has not required use of naloxone at home since last office visit. The patientis allergic to strawberry (diagnostic). Past Medical History  Sheng Palencia  has a past medical history of Herniated disc, cervical.    Past Surgical History  The patient  has a past surgical history that includes Tubal ligation; Appendectomy; Rotator cuff repair (Left); Tonsillectomy; other surgical history (Bilateral, 03/06/2017); other surgical history (Bilateral, 05/02/2017); Nerve Surgery (Left, 08/01/2017); pr dest,paravertebral,c/t,single (Left, 8/1/2017); other surgical history (Right, 11/07/2017); pr dest,paravertebral,c/t,single (Right, 11/7/2017); other surgical history (Left, 05/03/2018); pr dstr nrolytc agnt parverteb fct sngl crvcl/thora (Left, 5/3/2018); pr dstr nrolytc agnt parverteb fct sngl crvcl/thora (Right, 7/12/2018); pr office/outpt visit,procedure only (N/A, 11/8/2018); and Cervical spine surgery (N/A, 4/4/2019). Family History  This patient's family history includes Cancer in her father and another family member; Dementia in her mother; Diabetes in her mother; Stroke in her mother. Social History  Sheng Palencia  reports that she has been smoking cigarettes. She has a 30.00 pack-year smoking history. She has never used smokeless tobacco. She reports that she does not drink alcohol or use drugs. Medications    Current Outpatient Medications:     traMADol (ULTRAM) 50 MG tablet, Take 1 tablet by mouth every 8 hours as needed for Pain for up to 30 days. , Disp: 90 tablet, Rfl: 0    gabapentin (NEURONTIN) 300 MG capsule, Take 1 capsule by mouth 3 times daily for 30 days.  Take 1 capsule in AM (300mg), 1 capsule in afternoon(300mg), and 2 capsules in PM(600mg), Disp: 120 capsule, Rfl: 1    ondansetron (ZOFRAN) 4 MG tablet, Take 4 mg by mouth every 8 hours as needed for Nausea or Vomiting, Disp: , Rfl:    cyclobenzaprine (FLEXERIL) 5 MG tablet, Take 5 mg by mouth 2 times daily as needed , Disp: , Rfl:     Subjective:      Review of Systems   HENT: Positive for voice change. Musculoskeletal: Positive for arthralgias, back pain, myalgias, neck pain and neck stiffness. Neurological: Positive for numbness. Objective:     Vitals:    06/20/19 0748   BP: 102/61   Site: Left Upper Arm   Position: Sitting   Cuff Size: Medium Adult   Pulse: 90   Weight: 143 lb 1.3 oz (64.9 kg)   Height: 5' 5\" (1.651 m)       Physical Exam   Constitutional: She is oriented to person, place, and time. She appears well-developed and well-nourished. No distress. HENT:   Head: Normocephalic and atraumatic. Right Ear: External ear normal.   Left Ear: External ear normal.   Nose: Nose normal.   Mouth/Throat: Oropharynx is clear and moist. No oropharyngeal exudate. Loss of voice    Eyes: Pupils are equal, round, and reactive to light. Conjunctivae and EOM are normal. Right eye exhibits no discharge. Left eye exhibits no discharge. No scleral icterus. Neck: Normal range of motion and full passive range of motion without pain. Neck supple. No muscular tenderness present. No neck rigidity. No edema, no erythema and normal range of motion present. No thyromegaly present. Cardiovascular: Normal rate, regular rhythm, normal heart sounds and intact distal pulses. Exam reveals no gallop and no friction rub. No murmur heard. Pulmonary/Chest: Effort normal and breath sounds normal. No respiratory distress. She has no wheezes. She has no rales. She exhibits no tenderness. Abdominal: Soft. Bowel sounds are normal. She exhibits no distension. There is no tenderness. There is no rebound and no guarding. Musculoskeletal: She exhibits tenderness.         Right shoulder: Normal.        Left shoulder: Normal.        Right elbow: Normal.       Left elbow: Normal.        Right wrist: Normal.        Left wrist: Normal.        Right hip: Normal. Left hip: Normal.        Right knee: Normal.        Left knee: Normal.        Right ankle: Normal.        Left ankle: Normal.        Cervical back: She exhibits tenderness, pain and spasm. Thoracic back: She exhibits tenderness, pain and spasm. Lumbar back: She exhibits tenderness. Back:         Right upper leg: Normal.        Left upper leg: Normal.        Right lower leg: Normal.        Left lower leg: Normal.        Right foot: Normal.        Left foot: Normal.   Neurological: She is alert and oriented to person, place, and time. She has normal strength. She is not disoriented. She displays no atrophy. A sensory deficit is present. No cranial nerve deficit. She exhibits normal muscle tone. She displays a negative Romberg sign. Coordination and gait normal. She displays no Babinski's sign on the right side. Reflex Scores:       Tricep reflexes are 1+ on the right side and 2+ on the left side. Bicep reflexes are 1+ on the right side and 2+ on the left side. Brachioradialis reflexes are 1+ on the right side and 2+ on the left side. Patellar reflexes are 2+ on the right side and 2+ on the left side. Achilles reflexes are 2+ on the right side and 2+ on the left side. SLR-  Motor 5/5 lower extremities, 4/5 strength bilateral upper extremities   numbness in right hand      Skin: Skin is warm. No rash noted. She is not diaphoretic. No erythema. No pallor. Psychiatric: Her mood appears not anxious. Her affect is not angry, not blunt, not labile and not inappropriate. Her speech is not rapid and/or pressured, not delayed, not tangential and not slurred. She is not agitated, not aggressive, not hyperactive, not slowed, not withdrawn, not actively hallucinating and not combative. Thought content is not paranoid and not delusional. Cognition and memory are not impaired. She does not express impulsivity or inappropriate judgment.  She does not exhibit a depressed mood. She expresses no homicidal and no suicidal ideation. She expresses no suicidal plans and no homicidal plans. She is communicative. She exhibits normal recent memory and normal remote memory. She is attentive. Nursing note and vitals reviewed. HASEEB test: negative   Yeomans test: negative   Gaenslen test: negative      Assessment:     1. Spondylosis of cervical region without myelopathy or radiculopathy    2. Cervical stenosis of spinal canal    3. Cervical radiculopathy    4. Myofascial muscle pain    5. Chronic pain syndrome            Plan:      · OARRS reviewed. Current MED: 15.00  · Patient was not offered naloxone for home. · Discussed long term side effects of medications, tolerance, dependency and addiction. · Previous UDS reviewed  · UDS preformed today for compliance. · Patient told can not receive any pain medications from any other source. · No evidence of abuse, diversion or aberrant behavior.  Medications and/or procedures to improve function and quality of life- patient understanding with this and that may not be pain free   Discussed with patient about safe storage of medications at home   Discussed possible weaning of medication dosing dependent on treatment/procedure results.  Discussed with patient about risks with procedure including infection, reaction to medication, increased pain, or bleeding.  Procedure notes reviewed in detail.  Reports increased pain afetr cervical trigger point injections.  Increased pain with therapy    No relief from ISAIAH X 2    Received over 75% relief from previous C-facet RFA.  Plan bilateral C-facetRFA @ C4-5, C5-6, and C6-7 Left side first for longer term pain relief. Procedure and risks discussed in detail with patient. · Continue Ultram TID prn- filled 5/31/2018, Neurontin 300 mg BID- filled 6/18/2019. Patient is compliant       Meds. Prescribed:   No orders of the defined types were placed in this encounter.       Return for bilateral C-facetRFA @ C4-5, C5-6, and C6-7 Left side first. , follow up after procedure.          Electronically signed by TERELL Childress CNP on6/20/2019 at 8:20 AM

## 2019-07-15 DIAGNOSIS — M47.812 SPONDYLOSIS OF CERVICAL REGION WITHOUT MYELOPATHY OR RADICULOPATHY: ICD-10-CM

## 2019-07-15 DIAGNOSIS — G89.4 CHRONIC PAIN SYNDROME: ICD-10-CM

## 2019-07-15 RX ORDER — TRAMADOL HYDROCHLORIDE 50 MG/1
50 TABLET ORAL EVERY 8 HOURS PRN
Qty: 90 TABLET | Refills: 0 | Status: SHIPPED | OUTPATIENT
Start: 2019-07-18 | End: 2019-08-22 | Stop reason: SDUPTHER

## 2019-07-15 RX ORDER — GABAPENTIN 300 MG/1
300 CAPSULE ORAL 3 TIMES DAILY
Qty: 120 CAPSULE | Refills: 1 | Status: SHIPPED | OUTPATIENT
Start: 2019-07-18 | End: 2019-09-12 | Stop reason: SDUPTHER

## 2019-07-22 DIAGNOSIS — G89.4 CHRONIC PAIN SYNDROME: ICD-10-CM

## 2019-07-22 NOTE — TELEPHONE ENCOUNTER
Ramila Barnes called requesting a refill on the following medications:  Requested Prescriptions     Pending Prescriptions Disp Refills    traMADol (ULTRAM) 50 MG tablet 90 tablet 0     Sig: Take 1 tablet by mouth every 8 hours as needed for Pain for up to 30 days.      Pharmacy verified:  .[facility] [place], [place]    Date of last visit: 6/20/19  Date of next visit (if applicable): 4/88/6151

## 2019-07-23 RX ORDER — TRAMADOL HYDROCHLORIDE 50 MG/1
50 TABLET ORAL EVERY 8 HOURS PRN
Qty: 90 TABLET | Refills: 0 | OUTPATIENT
Start: 2019-07-23 | End: 2019-08-22

## 2019-07-29 ENCOUNTER — PREP FOR PROCEDURE (OUTPATIENT)
Dept: PHYSICAL MEDICINE AND REHAB | Age: 54
End: 2019-07-29

## 2019-07-29 NOTE — H&P
mouth 2 times daily as needed , Disp: , Rfl:         Subjective:      Review of Systems   HENT: Positive for voice change. Musculoskeletal: Positive for arthralgias, back pain, myalgias, neck pain and neck stiffness. Neurological: Positive for numbness. Objective:      Vitals                                  Weight: 143 lb 1.3 oz (64.9 kg)   Height: 5' 5\" (1.651 m)            Physical Exam   Constitutional: She is oriented to person, place, and time. She appears well-developed and well-nourished. No distress. HENT:   Head: Normocephalic and atraumatic. Right Ear: External ear normal.   Left Ear: External ear normal.   Nose: Nose normal.   Mouth/Throat: Oropharynx is clear and moist. No oropharyngeal exudate. Loss of voice    Eyes: Pupils are equal, round, and reactive to light. Conjunctivae and EOM are normal. Right eye exhibits no discharge. Left eye exhibits no discharge. No scleral icterus. Neck: Normal range of motion and full passive range of motion without pain. Neck supple. No muscular tenderness present. No neck rigidity. No edema, no erythema and normal range of motion present. No thyromegaly present. Cardiovascular: Normal rate, regular rhythm, normal heart sounds and intact distal pulses. Exam reveals no gallop and no friction rub. No murmur heard. Pulmonary/Chest: Effort normal and breath sounds normal. No respiratory distress. She has no wheezes. She has no rales. She exhibits no tenderness. Abdominal: Soft. Bowel sounds are normal. She exhibits no distension. There is no tenderness. There is no rebound and no guarding. Musculoskeletal: She exhibits tenderness.         Right shoulder: Normal.        Left shoulder: Normal.        Right elbow: Normal.       Left elbow: Normal.        Right wrist: Normal.        Left wrist: Normal.        Right hip: Normal.        Left hip: Normal.        Right knee: Normal.        Left knee: Normal.        Right ankle: Normal.        Left

## 2019-08-08 ENCOUNTER — ANESTHESIA (OUTPATIENT)
Dept: OPERATING ROOM | Age: 54
End: 2019-08-08
Payer: COMMERCIAL

## 2019-08-08 ENCOUNTER — APPOINTMENT (OUTPATIENT)
Dept: GENERAL RADIOLOGY | Age: 54
End: 2019-08-08
Attending: PAIN MEDICINE
Payer: COMMERCIAL

## 2019-08-08 ENCOUNTER — HOSPITAL ENCOUNTER (OUTPATIENT)
Age: 54
Setting detail: OUTPATIENT SURGERY
Discharge: HOME | End: 2019-08-08
Attending: PAIN MEDICINE | Admitting: PAIN MEDICINE
Payer: COMMERCIAL

## 2019-08-08 ENCOUNTER — ANESTHESIA EVENT (OUTPATIENT)
Dept: OPERATING ROOM | Age: 54
End: 2019-08-08
Payer: COMMERCIAL

## 2019-08-08 VITALS
WEIGHT: 157.4 LBS | OXYGEN SATURATION: 96 % | DIASTOLIC BLOOD PRESSURE: 56 MMHG | RESPIRATION RATE: 16 BRPM | HEART RATE: 91 BPM | TEMPERATURE: 97.9 F | HEIGHT: 65 IN | BODY MASS INDEX: 26.23 KG/M2 | SYSTOLIC BLOOD PRESSURE: 114 MMHG

## 2019-08-08 VITALS
SYSTOLIC BLOOD PRESSURE: 103 MMHG | DIASTOLIC BLOOD PRESSURE: 57 MMHG | OXYGEN SATURATION: 98 % | RESPIRATION RATE: 7 BRPM

## 2019-08-08 PROCEDURE — 6360000002 HC RX W HCPCS: Performed by: PAIN MEDICINE

## 2019-08-08 PROCEDURE — 3209999900 FLUORO FOR SURGICAL PROCEDURES

## 2019-08-08 PROCEDURE — 2500000003 HC RX 250 WO HCPCS: Performed by: NURSE ANESTHETIST, CERTIFIED REGISTERED

## 2019-08-08 PROCEDURE — 3600000056 HC PAIN LEVEL 4 BASE: Performed by: PAIN MEDICINE

## 2019-08-08 PROCEDURE — 2709999900 HC NON-CHARGEABLE SUPPLY: Performed by: PAIN MEDICINE

## 2019-08-08 PROCEDURE — 64634 DESTROY C/TH FACET JNT ADDL: CPT | Performed by: PAIN MEDICINE

## 2019-08-08 PROCEDURE — 2500000003 HC RX 250 WO HCPCS: Performed by: PAIN MEDICINE

## 2019-08-08 PROCEDURE — 3700000000 HC ANESTHESIA ATTENDED CARE: Performed by: PAIN MEDICINE

## 2019-08-08 PROCEDURE — 7100000011 HC PHASE II RECOVERY - ADDTL 15 MIN: Performed by: PAIN MEDICINE

## 2019-08-08 PROCEDURE — 3700000001 HC ADD 15 MINUTES (ANESTHESIA): Performed by: PAIN MEDICINE

## 2019-08-08 PROCEDURE — 3600000057 HC PAIN LEVEL 4 ADDL 15 MIN: Performed by: PAIN MEDICINE

## 2019-08-08 PROCEDURE — 7100000010 HC PHASE II RECOVERY - FIRST 15 MIN: Performed by: PAIN MEDICINE

## 2019-08-08 PROCEDURE — 64633 DESTROY CERV/THOR FACET JNT: CPT | Performed by: PAIN MEDICINE

## 2019-08-08 PROCEDURE — 6360000002 HC RX W HCPCS: Performed by: NURSE ANESTHETIST, CERTIFIED REGISTERED

## 2019-08-08 RX ORDER — LIDOCAINE HYDROCHLORIDE 10 MG/ML
INJECTION, SOLUTION INFILTRATION; PERINEURAL PRN
Status: DISCONTINUED | OUTPATIENT
Start: 2019-08-08 | End: 2019-08-08 | Stop reason: SDUPTHER

## 2019-08-08 RX ORDER — PROPOFOL 10 MG/ML
INJECTION, EMULSION INTRAVENOUS PRN
Status: DISCONTINUED | OUTPATIENT
Start: 2019-08-08 | End: 2019-08-08 | Stop reason: SDUPTHER

## 2019-08-08 RX ORDER — ATORVASTATIN CALCIUM 20 MG/1
20 TABLET, FILM COATED ORAL DAILY
COMMUNITY

## 2019-08-08 RX ORDER — FENTANYL CITRATE 50 UG/ML
INJECTION, SOLUTION INTRAMUSCULAR; INTRAVENOUS PRN
Status: DISCONTINUED | OUTPATIENT
Start: 2019-08-08 | End: 2019-08-08 | Stop reason: SDUPTHER

## 2019-08-08 RX ORDER — LIDOCAINE HYDROCHLORIDE 10 MG/ML
INJECTION, SOLUTION EPIDURAL; INFILTRATION; INTRACAUDAL; PERINEURAL PRN
Status: DISCONTINUED | OUTPATIENT
Start: 2019-08-08 | End: 2019-08-08 | Stop reason: ALTCHOICE

## 2019-08-08 RX ORDER — ROPIVACAINE HYDROCHLORIDE 2 MG/ML
INJECTION, SOLUTION EPIDURAL; INFILTRATION; PERINEURAL PRN
Status: DISCONTINUED | OUTPATIENT
Start: 2019-08-08 | End: 2019-08-08 | Stop reason: ALTCHOICE

## 2019-08-08 RX ORDER — DEXAMETHASONE SODIUM PHOSPHATE 4 MG/ML
INJECTION, SOLUTION INTRA-ARTICULAR; INTRALESIONAL; INTRAMUSCULAR; INTRAVENOUS; SOFT TISSUE PRN
Status: DISCONTINUED | OUTPATIENT
Start: 2019-08-08 | End: 2019-08-08 | Stop reason: ALTCHOICE

## 2019-08-08 RX ADMIN — FENTANYL CITRATE 50 MCG: 50 INJECTION INTRAMUSCULAR; INTRAVENOUS at 09:54

## 2019-08-08 RX ADMIN — FENTANYL CITRATE 50 MCG: 50 INJECTION INTRAMUSCULAR; INTRAVENOUS at 09:47

## 2019-08-08 RX ADMIN — LIDOCAINE HYDROCHLORIDE 20 MG: 10 INJECTION, SOLUTION INFILTRATION; PERINEURAL at 09:57

## 2019-08-08 RX ADMIN — PROPOFOL 50 MG: 10 INJECTION, EMULSION INTRAVENOUS at 09:57

## 2019-08-08 ASSESSMENT — PULMONARY FUNCTION TESTS
PIF_VALUE: 0

## 2019-08-08 ASSESSMENT — LIFESTYLE VARIABLES: SMOKING_STATUS: 1

## 2019-08-08 ASSESSMENT — PAIN - FUNCTIONAL ASSESSMENT: PAIN_FUNCTIONAL_ASSESSMENT: 0-10

## 2019-08-08 NOTE — ANESTHESIA POSTPROCEDURE EVALUATION
Department of Anesthesiology  Postprocedure Note    Patient: Ramila Barnes  MRN: [id number]  YOB: 1965  Date of evaluation: 8/8/2019  Time:  10:37 AM     Procedure Summary     Date:  08/08/19 Room / Location:  [facility] OR 03 / [address]    Anesthesia Start:  0945 Anesthesia Stop:  1001    Procedure:  C-RFA @ C4-5, C5-6, and C6-7 Left side first. (Left Neck) Diagnosis:  (CERVICAL SPONDYLOSIS)    Surgeon:  Basia Leary MD Responsible Provider:  Boby Og DO    Anesthesia Type:  MAC ASA Status:  2          Anesthesia Type: MAC    Allie Phase I:      Allie Phase II: Allie Score: 10    Last vitals: Reviewed and per EMR flowsheets.        Anesthesia Post Evaluation    Patient location during evaluation: bedside  Patient participation: complete - patient participated  Level of consciousness: awake and alert  Pain score: 0  Airway patency: patent  Nausea & Vomiting: no nausea and no vomiting  Complications: no  Cardiovascular status: hemodynamically stable and blood pressure returned to baseline  Respiratory status: spontaneous ventilation, room air and acceptable  Hydration status: stable

## 2019-08-08 NOTE — ANESTHESIA PRE PROCEDURE
OTHER SURGICAL HISTORY Bilateral 05/02/2017    Cervical Facet Block C4-5, C5-6, C6-7 Bilateral    OTHER SURGICAL HISTORY Right 11/07/2017    Cervical Facet MBB RFA C4-5, C5-6, C6-7     OTHER SURGICAL HISTORY Left 05/03/2018    Cervical RFA at C4-5, C5-6, C6-7    NE DEST,PARAVERTEBRAL,C/T,SINGLE Left 8/1/2017    CERVICAL RFA C4-5, C5-6, C6-7, LEFT performed by Adrianne Moritz, MD at [address] [place] [address] Right 11/7/2017    RIGHT SIDE C-FACET RFA @ C4-5, C5-6, C6-7 performed by Adrianne Moritz, MD at [address] [facility] CRVCL/THORA Left 5/3/2018    C-RFA  @ C4-5,5-6,6-7. LEFT SIDE FIRST. performed by Adrianne Moritz, MD [facility] [address] [facility] Right 7/12/2018    C-RFA RIGHT SIDE @ X4-9,4-8,2-1 performed by Adrianne Moritz, MD at [address] [place] [address] OFFICE/OUTPT VISIT,PROCEDURE ONLY N/A 11/8/2018    CERVICAL INTERLAMINAR ALICE ISAIAH @ C7 right performed by Adrianne Moritz, MD at [place] [address] Left     TONSILLECTOMY      TUBAL LIGATION         Social History:    Social History     Tobacco Use    Smoking status: Current Every Day Smoker     Packs/day: 1.00     Years: 30.00     Pack years: 30.00     Types: Cigarettes    Smokeless tobacco: Never Used   Substance Use Topics    Alcohol use: No     Comment: rarely                                Ready to quit: Not Answered  Counseling given: Not Answered      Vital Signs (Current): There were no vitals filed for this visit.                                            BP Readings from Last 3 Encounters:   06/20/19 102/61   05/14/19 118/66   04/18/19 95/63       NPO Status:                                                                                 BMI:   Wt Readings from Last 3 Encounters:   06/20/19 143 lb 1.3 oz (64.9 kg)   05/14/19 143 lb (64.9 kg)   04/18/19

## 2019-08-22 DIAGNOSIS — G89.4 CHRONIC PAIN SYNDROME: ICD-10-CM

## 2019-08-22 RX ORDER — TRAMADOL HYDROCHLORIDE 50 MG/1
50 TABLET ORAL EVERY 8 HOURS PRN
Qty: 90 TABLET | Refills: 0 | Status: SHIPPED | OUTPATIENT
Start: 2019-08-24 | End: 2019-09-25 | Stop reason: SDUPTHER

## 2019-09-12 ENCOUNTER — OFFICE VISIT (OUTPATIENT)
Dept: PHYSICAL MEDICINE AND REHAB | Age: 54
End: 2019-09-12
Payer: COMMERCIAL

## 2019-09-12 VITALS
BODY MASS INDEX: 26.23 KG/M2 | HEART RATE: 87 BPM | DIASTOLIC BLOOD PRESSURE: 70 MMHG | WEIGHT: 157.41 LBS | HEIGHT: 65 IN | SYSTOLIC BLOOD PRESSURE: 100 MMHG

## 2019-09-12 DIAGNOSIS — M47.812 SPONDYLOSIS OF CERVICAL REGION WITHOUT MYELOPATHY OR RADICULOPATHY: ICD-10-CM

## 2019-09-12 DIAGNOSIS — M54.12 CERVICAL RADICULOPATHY: ICD-10-CM

## 2019-09-12 DIAGNOSIS — G89.4 CHRONIC PAIN SYNDROME: ICD-10-CM

## 2019-09-12 DIAGNOSIS — M47.812 SPONDYLOSIS OF CERVICAL REGION WITHOUT MYELOPATHY OR RADICULOPATHY: Primary | ICD-10-CM

## 2019-09-12 DIAGNOSIS — M48.02 CERVICAL STENOSIS OF SPINAL CANAL: ICD-10-CM

## 2019-09-12 DIAGNOSIS — M79.18 MYOFASCIAL MUSCLE PAIN: ICD-10-CM

## 2019-09-12 PROCEDURE — 3017F COLORECTAL CA SCREEN DOC REV: CPT | Performed by: NURSE PRACTITIONER

## 2019-09-12 PROCEDURE — G8419 CALC BMI OUT NRM PARAM NOF/U: HCPCS | Performed by: NURSE PRACTITIONER

## 2019-09-12 PROCEDURE — 99213 OFFICE O/P EST LOW 20 MIN: CPT | Performed by: NURSE PRACTITIONER

## 2019-09-12 PROCEDURE — G8427 DOCREV CUR MEDS BY ELIG CLIN: HCPCS | Performed by: NURSE PRACTITIONER

## 2019-09-12 PROCEDURE — 4004F PT TOBACCO SCREEN RCVD TLK: CPT | Performed by: NURSE PRACTITIONER

## 2019-09-12 NOTE — TELEPHONE ENCOUNTER
OARRS reviewed. UDS: + for  Gabapentin and tramadol - consistent. Narcan offered: offered  Last seen: 5/14/2019.  Follow-up: 10/31/2019

## 2019-09-12 NOTE — PROGRESS NOTES
Take 5 mg by mouth 2 times daily as needed , Disp: , Rfl:     gabapentin (NEURONTIN) 300 MG capsule, Take 1 capsule by mouth 3 times daily for 30 days. Take 1 capsule in AM (300mg), 1 capsule in afternoon(300mg), and 2 capsules in PM(600mg), Disp: 120 capsule, Rfl: 1    Subjective:      Review of Systems   Musculoskeletal: Positive for arthralgias, joint swelling, myalgias, neck pain and neck stiffness. Neurological: Positive for weakness. Objective:     Vitals:    09/12/19 0857   BP: 100/70   Site: Right Upper Arm   Position: Sitting   Cuff Size: Medium Adult   Pulse: 87   Weight: 157 lb 6.5 oz (71.4 kg)   Height: 5' 5\" (1.651 m)       Physical Exam   Constitutional: She is oriented to person, place, and time. She appears well-developed and well-nourished. No distress. HENT:   Head: Normocephalic and atraumatic. Right Ear: External ear normal.   Left Ear: External ear normal.   Nose: Nose normal.   Mouth/Throat: Oropharynx is clear and moist. No oropharyngeal exudate. Loss of voice    Eyes: Pupils are equal, round, and reactive to light. Conjunctivae and EOM are normal. Right eye exhibits no discharge. Left eye exhibits no discharge. No scleral icterus. Neck: Normal range of motion and full passive range of motion without pain. Neck supple. No muscular tenderness present. No neck rigidity. No edema, no erythema and normal range of motion present. No thyromegaly present. Cardiovascular: Normal rate, regular rhythm, normal heart sounds and intact distal pulses. Exam reveals no gallop and no friction rub. No murmur heard. Pulmonary/Chest: Effort normal and breath sounds normal. No respiratory distress. She has no wheezes. She has no rales. She exhibits no tenderness. Abdominal: Soft. Bowel sounds are normal. She exhibits no distension. There is no tenderness. There is no rebound and no guarding. Musculoskeletal: She exhibits tenderness.         Right shoulder: Normal.        Left shoulder: Normal.        Right elbow: Normal.       Left elbow: Normal.        Right wrist: Normal.        Left wrist: Normal.        Right hip: Normal.        Left hip: Normal.        Right knee: Normal.        Left knee: Normal.        Right ankle: Normal.        Left ankle: Normal.        Cervical back: She exhibits tenderness, pain and spasm. Thoracic back: She exhibits tenderness, pain and spasm. Lumbar back: She exhibits tenderness. Back:         Right upper leg: Normal.        Left upper leg: Normal.        Right lower leg: Normal.        Left lower leg: Normal.        Right foot: Normal.        Left foot: Normal.   Neurological: She is alert and oriented to person, place, and time. She has normal strength. She is not disoriented. She displays no atrophy. A sensory deficit is present. No cranial nerve deficit. She exhibits normal muscle tone. She displays a negative Romberg sign. Coordination and gait normal. She displays no Babinski's sign on the right side. Reflex Scores:       Tricep reflexes are 1+ on the right side and 2+ on the left side. Bicep reflexes are 1+ on the right side and 2+ on the left side. Brachioradialis reflexes are 1+ on the right side and 2+ on the left side. Patellar reflexes are 2+ on the right side and 2+ on the left side. Achilles reflexes are 2+ on the right side and 2+ on the left side. SLR-  Motor 5/5 lower extremities, 4/5 strength bilateral upper extremities   numbness in right hand      Skin: Skin is warm. No rash noted. She is not diaphoretic. No erythema. No pallor. Psychiatric: Her mood appears not anxious. Her affect is not angry, not blunt, not labile and not inappropriate. Her speech is not rapid and/or pressured, not delayed, not tangential and not slurred. She is not agitated, not aggressive, not hyperactive, not slowed, not withdrawn, not actively hallucinating and not combative.  Thought content is not paranoid and not delusional. Cognition and memory are not impaired. She does not express impulsivity or inappropriate judgment. She does not exhibit a depressed mood. She expresses no homicidal and no suicidal ideation. She expresses no suicidal plans and no homicidal plans. She is communicative. She exhibits normal recent memory and normal remote memory. She is attentive. Nursing note and vitals reviewed. HASEEB test: negative   Yeomans test: negative   Gaenslen test: negative      Assessment:     1. Spondylosis of cervical region without myelopathy or radiculopathy    2. Cervical stenosis of spinal canal    3. Cervical radiculopathy    4. Myofascial muscle pain    5. Chronic pain syndrome            Plan:      · OARRS reviewed. Current MED: 15.00  · Patient was not offered naloxone for home. · Discussed long term side effects of medications, tolerance, dependency and addiction. · Previous UDS reviewed  · UDS preformed today for compliance. · Patient told can not receive any pain medications from any other source. · No evidence of abuse, diversion or aberrant behavior.  Medications and/or procedures to improve function and quality of life- patient understanding with this and that may not be pain free   Discussed with patient about safe storage of medications at home   Discussed possible weaning of medication dosing dependent on treatment/procedure results.  Discussed with patient about risks with procedure including infection, reaction to medication, increased pain, or bleeding.  Procedure notes reviewed in detail.  Receiving 70% relief of left side of neck pain, pain is mostly right side of neck at this time. · Plan Right C-facet RFA @ C4-5, C5-6, and C6-7 for longer term pain relief. Procedure and risks discussed in detail with patient.  Continue Ultram TID prn-Neurontin 300 mg BID. Patient is compliant Still has plenty       Meds.  Prescribed:   No orders of the defined types were placed in this

## 2019-09-13 RX ORDER — GABAPENTIN 300 MG/1
CAPSULE ORAL
Qty: 120 CAPSULE | Refills: 1 | Status: SHIPPED | OUTPATIENT
Start: 2019-09-13 | End: 2019-11-07 | Stop reason: SDUPTHER

## 2019-09-25 DIAGNOSIS — G89.4 CHRONIC PAIN SYNDROME: ICD-10-CM

## 2019-09-25 RX ORDER — TRAMADOL HYDROCHLORIDE 50 MG/1
50 TABLET ORAL EVERY 8 HOURS PRN
Qty: 90 TABLET | Refills: 0 | Status: SHIPPED | OUTPATIENT
Start: 2019-09-25 | End: 2019-10-31 | Stop reason: SDUPTHER

## 2019-09-25 NOTE — TELEPHONE ENCOUNTER
9/25/19    Page Strickland called requesting a refill on the following medications:  Requested Prescriptions     Pending Prescriptions Disp Refills    traMADol (ULTRAM) 50 MG tablet 90 tablet 0     Sig: Take 1 tablet by mouth every 8 hours as needed for Pain for up to 30 days. Pharmacy verified: [facility] in [place]  .       Date of last visit:   9/12/19  Date of next visit (if applicable): 70/97/3374  blm

## 2019-10-07 ENCOUNTER — PREP FOR PROCEDURE (OUTPATIENT)
Dept: PHYSICAL MEDICINE AND REHAB | Age: 54
End: 2019-10-07

## 2019-10-17 ENCOUNTER — ANESTHESIA (OUTPATIENT)
Dept: OPERATING ROOM | Age: 54
End: 2019-10-17
Payer: COMMERCIAL

## 2019-10-17 ENCOUNTER — APPOINTMENT (OUTPATIENT)
Dept: GENERAL RADIOLOGY | Age: 54
End: 2019-10-17
Attending: PAIN MEDICINE
Payer: COMMERCIAL

## 2019-10-17 ENCOUNTER — HOSPITAL ENCOUNTER (OUTPATIENT)
Age: 54
Setting detail: OUTPATIENT SURGERY
Discharge: HOME | End: 2019-10-17
Attending: PAIN MEDICINE | Admitting: PAIN MEDICINE
Payer: COMMERCIAL

## 2019-10-17 ENCOUNTER — ANESTHESIA EVENT (OUTPATIENT)
Dept: OPERATING ROOM | Age: 54
End: 2019-10-17
Payer: COMMERCIAL

## 2019-10-17 VITALS
OXYGEN SATURATION: 95 % | SYSTOLIC BLOOD PRESSURE: 90 MMHG | HEART RATE: 86 BPM | TEMPERATURE: 97 F | DIASTOLIC BLOOD PRESSURE: 48 MMHG | BODY MASS INDEX: 27.76 KG/M2 | HEIGHT: 65 IN | RESPIRATION RATE: 12 BRPM | WEIGHT: 166.6 LBS

## 2019-10-17 VITALS
DIASTOLIC BLOOD PRESSURE: 63 MMHG | RESPIRATION RATE: 15 BRPM | SYSTOLIC BLOOD PRESSURE: 133 MMHG | OXYGEN SATURATION: 98 % | TEMPERATURE: 96.8 F

## 2019-10-17 PROCEDURE — 3600000057 HC PAIN LEVEL 4 ADDL 15 MIN: Performed by: PAIN MEDICINE

## 2019-10-17 PROCEDURE — 3700000000 HC ANESTHESIA ATTENDED CARE: Performed by: PAIN MEDICINE

## 2019-10-17 PROCEDURE — 2709999900 HC NON-CHARGEABLE SUPPLY: Performed by: PAIN MEDICINE

## 2019-10-17 PROCEDURE — 7100000010 HC PHASE II RECOVERY - FIRST 15 MIN: Performed by: PAIN MEDICINE

## 2019-10-17 PROCEDURE — 3209999900 FLUORO FOR SURGICAL PROCEDURES

## 2019-10-17 PROCEDURE — 6360000002 HC RX W HCPCS: Performed by: NURSE ANESTHETIST, CERTIFIED REGISTERED

## 2019-10-17 PROCEDURE — 3600000056 HC PAIN LEVEL 4 BASE: Performed by: PAIN MEDICINE

## 2019-10-17 PROCEDURE — 3700000001 HC ADD 15 MINUTES (ANESTHESIA): Performed by: PAIN MEDICINE

## 2019-10-17 PROCEDURE — 2580000003 HC RX 258: Performed by: NURSE ANESTHETIST, CERTIFIED REGISTERED

## 2019-10-17 PROCEDURE — 6360000002 HC RX W HCPCS: Performed by: PAIN MEDICINE

## 2019-10-17 PROCEDURE — 2500000003 HC RX 250 WO HCPCS: Performed by: PAIN MEDICINE

## 2019-10-17 PROCEDURE — 64633 DESTROY CERV/THOR FACET JNT: CPT | Performed by: PAIN MEDICINE

## 2019-10-17 PROCEDURE — 64634 DESTROY C/TH FACET JNT ADDL: CPT | Performed by: PAIN MEDICINE

## 2019-10-17 PROCEDURE — 7100000011 HC PHASE II RECOVERY - ADDTL 15 MIN: Performed by: PAIN MEDICINE

## 2019-10-17 RX ORDER — DEXAMETHASONE SODIUM PHOSPHATE 4 MG/ML
INJECTION, SOLUTION INTRA-ARTICULAR; INTRALESIONAL; INTRAMUSCULAR; INTRAVENOUS; SOFT TISSUE PRN
Status: DISCONTINUED | OUTPATIENT
Start: 2019-10-17 | End: 2019-10-17 | Stop reason: ALTCHOICE

## 2019-10-17 RX ORDER — ROPIVACAINE HYDROCHLORIDE 2 MG/ML
INJECTION, SOLUTION EPIDURAL; INFILTRATION; PERINEURAL PRN
Status: DISCONTINUED | OUTPATIENT
Start: 2019-10-17 | End: 2019-10-17 | Stop reason: ALTCHOICE

## 2019-10-17 RX ORDER — PROPOFOL 10 MG/ML
INJECTION, EMULSION INTRAVENOUS PRN
Status: DISCONTINUED | OUTPATIENT
Start: 2019-10-17 | End: 2019-10-17 | Stop reason: SDUPTHER

## 2019-10-17 RX ORDER — SODIUM CHLORIDE 9 MG/ML
INJECTION, SOLUTION INTRAVENOUS CONTINUOUS PRN
Status: DISCONTINUED | OUTPATIENT
Start: 2019-10-17 | End: 2019-10-17 | Stop reason: SDUPTHER

## 2019-10-17 RX ORDER — LIDOCAINE HYDROCHLORIDE 10 MG/ML
INJECTION, SOLUTION EPIDURAL; INFILTRATION; INTRACAUDAL; PERINEURAL PRN
Status: DISCONTINUED | OUTPATIENT
Start: 2019-10-17 | End: 2019-10-17 | Stop reason: ALTCHOICE

## 2019-10-17 RX ORDER — FENTANYL CITRATE 50 UG/ML
INJECTION, SOLUTION INTRAMUSCULAR; INTRAVENOUS PRN
Status: DISCONTINUED | OUTPATIENT
Start: 2019-10-17 | End: 2019-10-17 | Stop reason: SDUPTHER

## 2019-10-17 RX ADMIN — FENTANYL CITRATE 50 MCG: 50 INJECTION INTRAMUSCULAR; INTRAVENOUS at 09:21

## 2019-10-17 RX ADMIN — SODIUM CHLORIDE: 9 INJECTION, SOLUTION INTRAVENOUS at 09:17

## 2019-10-17 RX ADMIN — PROPOFOL 50 MG: 10 INJECTION, EMULSION INTRAVENOUS at 09:31

## 2019-10-17 RX ADMIN — FENTANYL CITRATE 50 MCG: 50 INJECTION INTRAMUSCULAR; INTRAVENOUS at 09:25

## 2019-10-17 ASSESSMENT — PULMONARY FUNCTION TESTS
PIF_VALUE: 0

## 2019-10-17 ASSESSMENT — PAIN DESCRIPTION - DESCRIPTORS: DESCRIPTORS: ACHING;DULL

## 2019-10-17 ASSESSMENT — PAIN - FUNCTIONAL ASSESSMENT: PAIN_FUNCTIONAL_ASSESSMENT: 0-10

## 2019-10-17 ASSESSMENT — PAIN SCALES - GENERAL: PAINLEVEL_OUTOF10: 3

## 2019-10-31 ENCOUNTER — OFFICE VISIT (OUTPATIENT)
Dept: PHYSICAL MEDICINE AND REHAB | Age: 54
End: 2019-10-31
Payer: COMMERCIAL

## 2019-10-31 VITALS
SYSTOLIC BLOOD PRESSURE: 118 MMHG | WEIGHT: 166.67 LBS | BODY MASS INDEX: 27.77 KG/M2 | DIASTOLIC BLOOD PRESSURE: 78 MMHG | HEART RATE: 70 BPM | HEIGHT: 65 IN

## 2019-10-31 DIAGNOSIS — G89.4 CHRONIC PAIN SYNDROME: ICD-10-CM

## 2019-10-31 DIAGNOSIS — M48.02 CERVICAL STENOSIS OF SPINAL CANAL: ICD-10-CM

## 2019-10-31 DIAGNOSIS — M47.812 SPONDYLOSIS OF CERVICAL REGION WITHOUT MYELOPATHY OR RADICULOPATHY: Primary | ICD-10-CM

## 2019-10-31 DIAGNOSIS — M79.18 MYOFASCIAL MUSCLE PAIN: ICD-10-CM

## 2019-10-31 DIAGNOSIS — M54.12 CERVICAL RADICULOPATHY: ICD-10-CM

## 2019-10-31 PROCEDURE — 4004F PT TOBACCO SCREEN RCVD TLK: CPT | Performed by: NURSE PRACTITIONER

## 2019-10-31 PROCEDURE — 99213 OFFICE O/P EST LOW 20 MIN: CPT | Performed by: NURSE PRACTITIONER

## 2019-10-31 PROCEDURE — 3017F COLORECTAL CA SCREEN DOC REV: CPT | Performed by: NURSE PRACTITIONER

## 2019-10-31 PROCEDURE — G8427 DOCREV CUR MEDS BY ELIG CLIN: HCPCS | Performed by: NURSE PRACTITIONER

## 2019-10-31 PROCEDURE — G8484 FLU IMMUNIZE NO ADMIN: HCPCS | Performed by: NURSE PRACTITIONER

## 2019-10-31 PROCEDURE — G8419 CALC BMI OUT NRM PARAM NOF/U: HCPCS | Performed by: NURSE PRACTITIONER

## 2019-10-31 RX ORDER — TRAMADOL HYDROCHLORIDE 50 MG/1
50 TABLET ORAL EVERY 8 HOURS PRN
Qty: 90 TABLET | Refills: 0 | Status: SHIPPED | OUTPATIENT
Start: 2019-10-31 | End: 2019-11-30

## 2019-10-31 RX ORDER — TRAMADOL HYDROCHLORIDE 50 MG/1
TABLET ORAL
COMMUNITY
End: 2019-12-17 | Stop reason: SDUPTHER

## 2019-10-31 RX ORDER — METHYLPREDNISOLONE 4 MG/1
TABLET ORAL
Qty: 1 KIT | Refills: 0 | Status: SHIPPED | OUTPATIENT
Start: 2019-10-31 | End: 2019-11-06

## 2019-10-31 RX ORDER — NICOTINE 21 MG/24HR
PATCH, TRANSDERMAL 24 HOURS TRANSDERMAL
Refills: 1 | COMMUNITY
Start: 2019-09-14

## 2019-11-07 DIAGNOSIS — M47.812 SPONDYLOSIS OF CERVICAL REGION WITHOUT MYELOPATHY OR RADICULOPATHY: ICD-10-CM

## 2019-11-07 DIAGNOSIS — G89.4 CHRONIC PAIN SYNDROME: ICD-10-CM

## 2019-11-07 RX ORDER — GABAPENTIN 300 MG/1
CAPSULE ORAL
Qty: 120 CAPSULE | Refills: 1 | Status: SHIPPED | OUTPATIENT
Start: 2019-11-07 | End: 2020-01-06

## 2019-12-17 DIAGNOSIS — G89.4 CHRONIC PAIN SYNDROME: Primary | ICD-10-CM

## 2019-12-17 RX ORDER — TRAMADOL HYDROCHLORIDE 50 MG/1
50 TABLET ORAL EVERY 8 HOURS PRN
Qty: 90 TABLET | Refills: 0 | Status: SHIPPED | OUTPATIENT
Start: 2019-12-17 | End: 2020-01-16

## 2020-01-06 RX ORDER — GABAPENTIN 300 MG/1
CAPSULE ORAL
Qty: 120 CAPSULE | Refills: 1 | Status: SHIPPED | OUTPATIENT
Start: 2020-01-06 | End: 2020-02-05

## 2020-01-30 ENCOUNTER — OFFICE VISIT (OUTPATIENT)
Dept: PHYSICAL MEDICINE AND REHAB | Age: 55
End: 2020-01-30
Payer: COMMERCIAL

## 2020-01-30 VITALS
SYSTOLIC BLOOD PRESSURE: 118 MMHG | WEIGHT: 171.96 LBS | HEART RATE: 70 BPM | HEIGHT: 65 IN | BODY MASS INDEX: 28.65 KG/M2 | DIASTOLIC BLOOD PRESSURE: 64 MMHG

## 2020-01-30 PROCEDURE — G8419 CALC BMI OUT NRM PARAM NOF/U: HCPCS | Performed by: NURSE PRACTITIONER

## 2020-01-30 PROCEDURE — G8484 FLU IMMUNIZE NO ADMIN: HCPCS | Performed by: NURSE PRACTITIONER

## 2020-01-30 PROCEDURE — 3017F COLORECTAL CA SCREEN DOC REV: CPT | Performed by: NURSE PRACTITIONER

## 2020-01-30 PROCEDURE — G8427 DOCREV CUR MEDS BY ELIG CLIN: HCPCS | Performed by: NURSE PRACTITIONER

## 2020-01-30 PROCEDURE — 99213 OFFICE O/P EST LOW 20 MIN: CPT | Performed by: NURSE PRACTITIONER

## 2020-01-30 PROCEDURE — 4004F PT TOBACCO SCREEN RCVD TLK: CPT | Performed by: NURSE PRACTITIONER

## 2020-01-30 NOTE — PROGRESS NOTES
[facility]  [address]  Dept: [phone]  Dept Fax: [phone]: [phone]    Visit Date: 1/30/2020    Functionality Assessment/Goals Worksheet     On a scale of 0 (Does not Interfere) to 10 (Completely Interferes)     1. Which number describes how during the past week pain has interfered with       the following:  A. General Activity:  8  B. Mood: 7  C. Walking Ability:  5  D. Normal Work (Includes both work outside the home and housework):  8  E. Relations with Other People:   5  F. Sleep:   10  G. Enjoyment of Life:   7    2. Patient Prefers to Take their Pain Medications:     [x]  On a regular basis   []  Only when necessary    []  Does not take pain medications    3. What are the Patient's Goals/Expectations for Visiting Pain Management? [x]  Learn about my pain    []  Receive Medication   []  Physical Therapy     []  Treat Depression   []  Receive Injections    []  Treat Sleep   []  Deal with Anxiety and Stress   []  Treat Opoid Dependence/Addiction   []  Other:      HPI:   Werner Xiao is a 53 y.o. female is here today for    Chief Complaint: Neck pain    HPI   3 month FU. Continues to have posterior neck pain radiating across to shoulder blades. Aching and dull pain that is constant. Pain is up and down. Medications help decreases pain to a tolerable level but has been holding for a week because has Open heart surgery scheduled tomorrow at [facility] for a bypass and mitral valve replacement. Pain is increased d/t holding medications. Is taking tylenol prn at this time. Procedures on hold. Medications reviewed. Patient denies side effects with medications. Patient states she is taking medications as prescribed. Shedenies receiving pain medications from other sources. She denies any ER visits since last visit.     Pain scale with out pain medications or at its (NEURONTIN) 300 MG capsule, TAKE 1 CAPSULE IN AM (300MG), 1 CAPSULE IN AFTERNOON(300MG), AND 2 CAPSULES IN PM(600MG), Disp: 120 capsule, Rfl: 1    nicotine (NICODERM CQ) 21 MG/24HR, APPLY 1 PATCH TO SKIN ONCE A DAY, Disp: , Rfl: 1    atorvastatin (LIPITOR) 20 MG tablet, Take 20 mg by mouth daily, Disp: , Rfl:     ondansetron (ZOFRAN) 4 MG tablet, Take 4 mg by mouth every 8 hours as needed for Nausea or Vomiting, Disp: , Rfl:     cyclobenzaprine (FLEXERIL) 5 MG tablet, Take 5 mg by mouth 2 times daily as needed , Disp: , Rfl:     Subjective:      Review of Systems   Cardiovascular: Negative for chest pain. Musculoskeletal: Positive for arthralgias, joint swelling, myalgias, neck pain and neck stiffness. Neurological: Positive for numbness. Objective:     Vitals:    01/30/20 0923   BP: 118/64   Site: Right Upper Arm   Position: Sitting   Cuff Size: Medium Adult   Pulse: 70   Weight: 171 lb 15.3 oz (78 kg)   Height: 5' 5\" (1.651 m)       Physical Exam  Vitals signs and nursing note reviewed. Constitutional:       General: She is not in acute distress. Appearance: She is well-developed. She is not diaphoretic. HENT:      Head: Normocephalic and atraumatic. Right Ear: External ear normal.      Left Ear: External ear normal.      Nose: Nose normal.      Mouth/Throat:      Pharynx: No oropharyngeal exudate. Eyes:      General: No scleral icterus. Right eye: No discharge. Left eye: No discharge. Conjunctiva/sclera: Conjunctivae normal.      Pupils: Pupils are equal, round, and reactive to light. Neck:      Musculoskeletal: Full passive range of motion without pain, normal range of motion and neck supple. Normal range of motion. No edema, erythema, neck rigidity or muscular tenderness. Thyroid: No thyromegaly. Cardiovascular:      Rate and Rhythm: Normal rate and regular rhythm. Heart sounds: Normal heart sounds. No murmur. No friction rub. No gallop.

## (undated) DEVICE — NEEDLE SPNL 22GA L3.5IN BLK HUB S STL REG WALL FIT STYL W/

## (undated) DEVICE — Device

## (undated) DEVICE — SOLUTION IV 1000ML 0.9% SOD CHL PH 5 INJ USP VIAFLX PLAS

## (undated) DEVICE — 3 ML SYRINGE LUER-LOCK TIP: Brand: MONOJECT

## (undated) DEVICE — GLOVE SURG SZ 65 THK91MIL LTX FREE SYN POLYISOPRENE

## (undated) DEVICE — GLOVE ORANGE PI 7   MSG9070

## (undated) DEVICE — HYPODERMIC SAFETY NEEDLE: Brand: MAGELLAN

## (undated) DEVICE — SHEET,DRAPE,3/4,53X77,STERILE: Brand: MEDLINE

## (undated) DEVICE — 6 ML SYRINGE LUER-LOCK TIP: Brand: MONOJECT

## (undated) DEVICE — TOWEL,OR,DSP,ST,BLUE,DLX,4/PK,20PK/CS: Brand: MEDLINE

## (undated) DEVICE — Z DISCONTINUED USE 2537982 ELECTRODE DISPER W/ CRD DISP

## (undated) DEVICE — SYRINGE MED 10ML LUERLOCK TIP W/O SFTY DISP

## (undated) DEVICE — ZINACTIVE USE 2537982 PAD GRND FOR RF PAIN MGMT DISP

## (undated) DEVICE — GLOVE ORANGE PI 7 1/2   MSG9075

## (undated) DEVICE — GAUZE,SPONGE,4"X4",12PLY,STERILE,LF,2'S: Brand: MEDLINE

## (undated) DEVICE — CHLORAPREP 26ML CLEAR

## (undated) DEVICE — NEEDLE SYR 18GA L1.5IN RED PLAS HUB S STL BLNT FILL W/O

## (undated) DEVICE — BANDAGE ADH W1XL3IN NAT FAB WVN FLX DURABLE N ADH PD SEAL

## (undated) DEVICE — TOWEL,OR,DSP,ST,BLUE,STD,4/PK,20PK/CS: Brand: MEDLINE